# Patient Record
Sex: MALE | Race: BLACK OR AFRICAN AMERICAN | NOT HISPANIC OR LATINO | Employment: OTHER | URBAN - METROPOLITAN AREA
[De-identification: names, ages, dates, MRNs, and addresses within clinical notes are randomized per-mention and may not be internally consistent; named-entity substitution may affect disease eponyms.]

---

## 2020-12-03 ENCOUNTER — HOSPITAL ENCOUNTER (EMERGENCY)
Facility: HOSPITAL | Age: 70
Discharge: HOME | End: 2020-12-03
Attending: FAMILY MEDICINE
Payer: OTHER GOVERNMENT

## 2020-12-03 VITALS
HEIGHT: 74 IN | OXYGEN SATURATION: 97 % | WEIGHT: 168 LBS | TEMPERATURE: 98 F | HEART RATE: 69 BPM | RESPIRATION RATE: 20 BRPM | SYSTOLIC BLOOD PRESSURE: 139 MMHG | DIASTOLIC BLOOD PRESSURE: 88 MMHG | BODY MASS INDEX: 21.56 KG/M2

## 2020-12-03 DIAGNOSIS — I65.22 CAROTID STENOSIS, LEFT: ICD-10-CM

## 2020-12-03 DIAGNOSIS — R41.0 CONFUSION: ICD-10-CM

## 2020-12-03 DIAGNOSIS — I63.50 CEREBROVASCULAR ACCIDENT (CVA) DUE TO OCCLUSION OF CEREBRAL ARTERY: Primary | ICD-10-CM

## 2020-12-03 LAB
ALBUMIN SERPL BCP-MCNC: 4.6 G/DL (ref 3.5–5.2)
ALP SERPL-CCNC: 79 U/L (ref 38–126)
ALT SERPL W/O P-5'-P-CCNC: 14 U/L (ref 10–44)
AMPHET+METHAMPHET UR QL: NEGATIVE
ANION GAP SERPL CALC-SCNC: 8 MMOL/L (ref 8–16)
APTT BLDCRRT: 23.8 SEC (ref 21–32)
AST SERPL-CCNC: 23 U/L (ref 15–46)
BACTERIA #/AREA URNS AUTO: NORMAL /HPF
BARBITURATES UR QL SCN>200 NG/ML: NEGATIVE
BASOPHILS # BLD AUTO: 0.04 K/UL (ref 0–0.2)
BASOPHILS NFR BLD: 0.8 % (ref 0–1.9)
BENZODIAZ UR QL SCN>200 NG/ML: NEGATIVE
BILIRUB SERPL-MCNC: 0.9 MG/DL (ref 0.1–1)
BILIRUB UR QL STRIP: NEGATIVE
BZE UR QL SCN: NEGATIVE
CALCIUM SERPL-MCNC: 9.6 MG/DL (ref 8.7–10.5)
CANNABINOIDS UR QL SCN: NEGATIVE
CHLORIDE SERPL-SCNC: 105 MMOL/L (ref 95–110)
CHOLEST SERPL-MCNC: 176 MG/DL (ref 120–199)
CHOLEST/HDLC SERPL: 4.4 {RATIO} (ref 2–5)
CLARITY UR REFRACT.AUTO: CLEAR
CO2 SERPL-SCNC: 30 MMOL/L (ref 23–29)
COLOR UR AUTO: ABNORMAL
CREAT SERPL-MCNC: 0.94 MG/DL (ref 0.5–1.4)
CREAT UR-MCNC: 263.2 MG/DL (ref 23–375)
DIFFERENTIAL METHOD: ABNORMAL
EOSINOPHIL # BLD AUTO: 0.2 K/UL (ref 0–0.5)
EOSINOPHIL NFR BLD: 3.4 % (ref 0–8)
ERYTHROCYTE [DISTWIDTH] IN BLOOD BY AUTOMATED COUNT: 16.8 % (ref 11.5–14.5)
EST. GFR  (AFRICAN AMERICAN): >60 ML/MIN/1.73 M^2
EST. GFR  (NON AFRICAN AMERICAN): >60 ML/MIN/1.73 M^2
GLUCOSE SERPL-MCNC: 103 MG/DL (ref 70–110)
GLUCOSE UR QL STRIP: ABNORMAL
HCT VFR BLD AUTO: 53.9 % (ref 40–54)
HDLC SERPL-MCNC: 40 MG/DL (ref 40–75)
HDLC SERPL: 22.7 % (ref 20–50)
HGB BLD-MCNC: 16.5 G/DL (ref 14–18)
HGB UR QL STRIP: ABNORMAL
HYALINE CASTS UR QL AUTO: 0 /LPF
IMM GRANULOCYTES # BLD AUTO: 0.02 K/UL (ref 0–0.04)
IMM GRANULOCYTES NFR BLD AUTO: 0.4 % (ref 0–0.5)
INR PPP: 1 (ref 0.8–1.2)
KETONES UR QL STRIP: ABNORMAL
LACTATE SERPL-SCNC: 2.5 MMOL/L (ref 0.5–2.2)
LDLC SERPL CALC-MCNC: 97 MG/DL (ref 63–159)
LEUKOCYTE ESTERASE UR QL STRIP: ABNORMAL
LYMPHOCYTES # BLD AUTO: 2 K/UL (ref 1–4.8)
LYMPHOCYTES NFR BLD: 38.3 % (ref 18–48)
MAGNESIUM SERPL-MCNC: 2.2 MG/DL (ref 1.6–2.6)
MCH RBC QN AUTO: 23.1 PG (ref 27–31)
MCHC RBC AUTO-ENTMCNC: 30.6 G/DL (ref 32–36)
MCV RBC AUTO: 76 FL (ref 82–98)
METHADONE UR QL SCN>300 NG/ML: NEGATIVE
MICROSCOPIC COMMENT: NORMAL
MONOCYTES # BLD AUTO: 0.4 K/UL (ref 0.3–1)
MONOCYTES NFR BLD: 7.4 % (ref 4–15)
NEUTROPHILS # BLD AUTO: 2.6 K/UL (ref 1.8–7.7)
NEUTROPHILS NFR BLD: 49.7 % (ref 38–73)
NITRITE UR QL STRIP: NEGATIVE
NONHDLC SERPL-MCNC: 136 MG/DL
NRBC BLD-RTO: 0 /100 WBC
NT-PROBNP SERPL-MCNC: 216 PG/ML (ref 5–900)
OPIATES UR QL SCN: NEGATIVE
PCP UR QL SCN>25 NG/ML: NEGATIVE
PH UR STRIP: 5 [PH] (ref 5–8)
PLATELET # BLD AUTO: 218 K/UL (ref 150–350)
PMV BLD AUTO: ABNORMAL FL (ref 9.2–12.9)
POCT GLUCOSE: 109 MG/DL (ref 70–110)
POTASSIUM SERPL-SCNC: 4.5 MMOL/L (ref 3.5–5.1)
PROT SERPL-MCNC: 8.7 G/DL (ref 6–8.4)
PROT UR QL STRIP: ABNORMAL
PROTHROMBIN TIME: 10.5 SEC (ref 9–12.5)
RBC # BLD AUTO: 7.14 M/UL (ref 4.6–6.2)
RBC #/AREA URNS AUTO: 1 /HPF (ref 0–4)
SARS-COV-2 RDRP RESP QL NAA+PROBE: NEGATIVE
SODIUM SERPL-SCNC: 143 MMOL/L (ref 136–145)
SP GR UR STRIP: 1.02 (ref 1–1.03)
TOXICOLOGY INFORMATION: NORMAL
TRIGL SERPL-MCNC: 195 MG/DL (ref 30–150)
TSH SERPL DL<=0.005 MIU/L-ACNC: 1.33 UIU/ML (ref 0.4–4)
URN SPEC COLLECT METH UR: ABNORMAL
UROBILINOGEN UR STRIP-ACNC: ABNORMAL EU/DL
UUN UR-MCNC: 10 MG/DL (ref 2–20)
WBC # BLD AUTO: 5.25 K/UL (ref 3.9–12.7)
WBC #/AREA URNS AUTO: 2 /HPF (ref 0–5)

## 2020-12-03 PROCEDURE — 96360 HYDRATION IV INFUSION INIT: CPT | Mod: ER

## 2020-12-03 PROCEDURE — 81000 URINALYSIS NONAUTO W/SCOPE: CPT | Mod: ER,59

## 2020-12-03 PROCEDURE — 80053 COMPREHEN METABOLIC PANEL: CPT | Mod: ER

## 2020-12-03 PROCEDURE — 83880 ASSAY OF NATRIURETIC PEPTIDE: CPT | Mod: ER

## 2020-12-03 PROCEDURE — 83605 ASSAY OF LACTIC ACID: CPT | Mod: ER

## 2020-12-03 PROCEDURE — 93010 ELECTROCARDIOGRAM REPORT: CPT | Mod: ,,, | Performed by: INTERNAL MEDICINE

## 2020-12-03 PROCEDURE — U0002 COVID-19 LAB TEST NON-CDC: HCPCS | Mod: ER

## 2020-12-03 PROCEDURE — 85025 COMPLETE CBC W/AUTO DIFF WBC: CPT | Mod: ER

## 2020-12-03 PROCEDURE — 85610 PROTHROMBIN TIME: CPT | Mod: ER

## 2020-12-03 PROCEDURE — 80307 DRUG TEST PRSMV CHEM ANLYZR: CPT | Mod: ER

## 2020-12-03 PROCEDURE — 99285 EMERGENCY DEPT VISIT HI MDM: CPT | Mod: 25,ER

## 2020-12-03 PROCEDURE — 83735 ASSAY OF MAGNESIUM: CPT | Mod: ER

## 2020-12-03 PROCEDURE — 85730 THROMBOPLASTIN TIME PARTIAL: CPT | Mod: ER

## 2020-12-03 PROCEDURE — 82962 GLUCOSE BLOOD TEST: CPT | Mod: ER

## 2020-12-03 PROCEDURE — 80061 LIPID PANEL: CPT

## 2020-12-03 PROCEDURE — 84443 ASSAY THYROID STIM HORMONE: CPT | Mod: ER

## 2020-12-03 PROCEDURE — 93005 ELECTROCARDIOGRAM TRACING: CPT | Mod: ER

## 2020-12-03 PROCEDURE — 25500020 PHARM REV CODE 255: Mod: ER | Performed by: FAMILY MEDICINE

## 2020-12-03 PROCEDURE — 25000003 PHARM REV CODE 250: Mod: ER | Performed by: FAMILY MEDICINE

## 2020-12-03 PROCEDURE — 93010 EKG 12-LEAD: ICD-10-PCS | Mod: ,,, | Performed by: INTERNAL MEDICINE

## 2020-12-03 RX ORDER — ATORVASTATIN CALCIUM 40 MG/1
40 TABLET, FILM COATED ORAL DAILY
Qty: 90 TABLET | Refills: 3 | Status: SHIPPED | OUTPATIENT
Start: 2020-12-03 | End: 2021-10-04

## 2020-12-03 RX ORDER — ASPIRIN 325 MG
325 TABLET ORAL DAILY
Qty: 30 TABLET | Refills: 11 | Status: SHIPPED | OUTPATIENT
Start: 2020-12-03 | End: 2022-04-20 | Stop reason: SDUPTHER

## 2020-12-03 RX ORDER — ASPIRIN 325 MG
325 TABLET, DELAYED RELEASE (ENTERIC COATED) ORAL
Status: COMPLETED | OUTPATIENT
Start: 2020-12-03 | End: 2020-12-03

## 2020-12-03 RX ADMIN — IOHEXOL 100 ML: 350 INJECTION, SOLUTION INTRAVENOUS at 03:12

## 2020-12-03 RX ADMIN — SODIUM CHLORIDE 1000 ML: 0.9 INJECTION, SOLUTION INTRAVENOUS at 02:12

## 2020-12-03 RX ADMIN — ASPIRIN 325 MG: 325 TABLET, COATED ORAL at 02:12

## 2020-12-03 NOTE — ED PROVIDER NOTES
"Encounter Date: 12/3/2020       History     Chief Complaint   Patient presents with    Altered Mental Status     Pt brought in with c/o intermittent confusion and "dragging right leg" over about the last 3 weeks. Pt's significant other reports he is not himself.      70-year-old male who has been having mild right-sided weakness as per daughter for last 3-4 weeks.  Occasionally looks like he had slurring of speech.  Sometimes he might be more forgetful than normal.  He also has some confusion sometimes.  Today patient complains of weakness but no confusion or forgetfulness.  No headache, nausea or vomiting.  No visual changes.  Patient is not on any medication and he has not seen a physician so far.    The history is provided by the patient (Daughter).     Review of patient's allergies indicates:  No Known Allergies  History reviewed. No pertinent past medical history.  History reviewed. No pertinent surgical history.  History reviewed. No pertinent family history.  Social History     Tobacco Use    Smoking status: Current Every Day Smoker     Packs/day: 1.00     Types: Cigarettes    Smokeless tobacco: Never Used   Substance Use Topics    Alcohol use: Yes     Comment: occasionally    Drug use: Not on file     Review of Systems   Constitutional: Negative for activity change, appetite change, chills and fever.   HENT: Negative for congestion, ear discharge, rhinorrhea, sinus pressure, sinus pain, sore throat and trouble swallowing.    Eyes: Negative for photophobia, pain, discharge, redness, itching and visual disturbance.   Respiratory: Negative for cough, chest tightness, shortness of breath and wheezing.    Cardiovascular: Negative for chest pain, palpitations and leg swelling.   Gastrointestinal: Negative for abdominal distention, abdominal pain, constipation, diarrhea, nausea and vomiting.   Genitourinary: Negative for dysuria, flank pain, frequency and hematuria.   Musculoskeletal: Negative for back pain, " gait problem, neck pain and neck stiffness.   Skin: Negative for rash and wound.   Neurological: Positive for speech difficulty and weakness. Negative for dizziness, tremors, seizures, syncope, light-headedness, numbness and headaches.   Psychiatric/Behavioral: Positive for confusion. Negative for behavioral problems, hallucinations and sleep disturbance. The patient is not nervous/anxious.    All other systems reviewed and are negative.      Physical Exam     Initial Vitals [12/03/20 1254]   BP Pulse Resp Temp SpO2   139/88 92 20 98.2 °F (36.8 °C) 97 %      MAP       --         Physical Exam    Nursing note and vitals reviewed.  Constitutional: Vital signs are normal. He appears well-developed and well-nourished. He is not diaphoretic. He is active. No distress.   HENT:   Head: Normocephalic and atraumatic.   Right Ear: Tympanic membrane normal.   Left Ear: Tympanic membrane normal.   Nose: Nose normal.   Mouth/Throat: Oropharynx is clear and moist.   Eyes: Conjunctivae, EOM and lids are normal. Pupils are equal, round, and reactive to light.   Neck: Trachea normal, normal range of motion and full passive range of motion without pain. Neck supple. Normal range of motion present. No neck rigidity.   Cardiovascular: Normal rate, regular rhythm, S1 normal, S2 normal, normal heart sounds, intact distal pulses and normal pulses.   Pulmonary/Chest: Breath sounds normal. No respiratory distress. He has no wheezes. He has no rales.   Abdominal: Soft. Normal appearance and bowel sounds are normal. He exhibits no distension. There is no abdominal tenderness. There is no guarding.   Musculoskeletal: Normal range of motion. No edema.      Comments: Mild right-sided weakness.  Only noted with walking.   Lymphadenopathy:     He has no cervical adenopathy.   Neurological: He is alert and oriented to person, place, and time. He has normal reflexes. No cranial nerve deficit or sensory deficit. He exhibits normal muscle tone. He  displays a negative Romberg sign. Coordination and gait normal. GCS score is 15. GCS eye subscore is 4. GCS verbal subscore is 5. GCS motor subscore is 6.   Very subtle to minimal weakness on right lower leg.   Skin: Skin is warm and intact. Capillary refill takes less than 2 seconds. No abrasion, no bruising and no rash noted.   Psychiatric: He has a normal mood and affect. His speech is normal and behavior is normal. Judgment and thought content normal. He is not actively hallucinating. Cognition and memory are normal. He is attentive.         ED Course   Procedures  Labs Reviewed   CBC W/ AUTO DIFFERENTIAL - Abnormal; Notable for the following components:       Result Value    RBC 7.14 (*)     MCV 76 (*)     MCH 23.1 (*)     MCHC 30.6 (*)     RDW 16.8 (*)     All other components within normal limits   COMPREHENSIVE METABOLIC PANEL - Abnormal; Notable for the following components:    CO2 30 (*)     Total Protein 8.7 (*)     All other components within normal limits   LACTIC ACID, PLASMA - Abnormal; Notable for the following components:    Lactate (Lactic Acid) 2.5 (*)     All other components within normal limits   URINALYSIS, REFLEX TO URINE CULTURE - Abnormal; Notable for the following components:    Protein, UA 1+ (*)     Glucose, UA 1+ (*)     Ketones, UA 1+ (*)     Occult Blood UA Trace (*)     Urobilinogen, UA 4.0-6.0 (*)     Leukocytes, UA 1+ (*)     All other components within normal limits    Narrative:     Specimen Source->Urine   NT-PRO NATRIURETIC PEPTIDE   APTT   PROTIME-INR   MAGNESIUM   DRUG SCREEN PANEL, URINE EMERGENCY    Narrative:     Specimen Source->Urine   SARS-COV-2 RNA AMPLIFICATION, QUAL   URINALYSIS MICROSCOPIC    Narrative:     Specimen Source->Urine   LIPID PANEL   TSH   TSH   LIPID PANEL   POCT GLUCOSE     EKG Readings: (Independently Interpreted)   Initial Reading: No STEMI. Rhythm: Normal Sinus Rhythm. Heart Rate: 82. Ectopy: No Ectopy. Conduction: Normal. ST Segments: Normal ST  Segments. T Waves: Normal. T Waves Flipped: II, III and AVF. Clinical Impression: Normal Sinus Rhythm     ECG Results          EKG 12-lead (In process)  Result time 12/03/20 13:55:00    In process by Interface, Lab In [facility] (12/03/20 13:55:00)                 Narrative:    Test Reason : R41.82,    Vent. Rate : 082 BPM     Atrial Rate : 082 BPM     P-R Int : 152 ms          QRS Dur : 102 ms      QT Int : 370 ms       P-R-T Axes : 065 039 -39 degrees     QTc Int : 432 ms    Normal sinus rhythm  Inferior infarct ,age undetermined  Cannot rule out Anterior infarct ,age undetermined  Abnormal ECG  No previous ECGs available    Referred By: [email or web address]   SELF           Confirmed By:                             Imaging Results          CTA Brain (Final result)  Result time 12/03/20 15:59:48    Final result by Marvin Crawley MD (12/03/20 15:59:48)                 Impression:      High-grade stenosis left supraclinoid ICA with diminished perfusion involving the distal branches of the posterior left middle cerebral artery.    Nonspecific small caliber left A1 ABENA segment.    Chronic left parietal infarct.    All CT scans at this facility use dose modulation, iterative reconstruction and/or weight based dosing when appropriate to reduce radiation dose to as low as reasonably achievable.      Electronically signed by: Marvin Crawley MD  Date:    12/03/2020  Time:    15:59             Narrative:    EXAMINATION:  CTA HEAD    CLINICAL HISTORY:  CVA.  Stroke, follow up;    TECHNIQUE:  Standard contrast enhanced CTA of brain with 100 cc IV Omnipaque 350 contrast with 3D MIP reformations.    COMPARISON:  CT brain without contrast 12/03/2020    FINDINGS:  The right skull base ICA reveals mild calcified plaque.  The supraclinoid ICA is patent.  The right anterior and middle cerebral arteries and branches appear normal.    The left skull base ICA is patent.  There is high-grade stenosis, probably 70-80% of the left supraclinoid  ICA.  Probably best seen on source data images 111 through 119.  The left middle cerebral artery demonstrates normal M1 and M2 segments.  Distal cortical branches appear hypoperfused.  The left A1 ABENA segment is small in caliber the distal anterior cerebral artery appears patent but smaller than the right which is nonspecific.    In the posterior circulation the vertebral arteries are patent.  The basilar artery is patent.  Superior cerebellar arteries appear normal.  Posterior cerebral arteries are patent.    There is evidence of a chronic left parietal infarct.    NASCET criteria are used for carotid artery stenosis measurements.                               CT Head Without Contrast (Final result)  Result time 12/03/20 14:07:20    Final result by Nicolas Lambert MD (12/03/20 14:07:20)                 Impression:      Atrophy.  Small vessel disease.  Old left parietal territory infarct.  No acute intracranial abnormality..    All CT scans at this facility use dose modulation, iterative reconstruction and/or weight based dosing when appropriate to reduce radiation dose to as low as reasonably achievable.      Electronically signed by: Nicolas Lambert MD  Date:    12/03/2020  Time:    14:07             Narrative:    EXAMINATION:  CT HEAD WITHOUT CONTRAST    CLINICAL HISTORY:  Ataxia, stroke suspected;Altered mental status;    TECHNIQUE:  Axial CT images of the head were obtained without  contrast.    FINDINGS:  Subacute/old left parietal territory infarct..  Small-vessel disease.  Minimal intracranial atherosclerotic calcifications..  Mild diffuse cerebral atrophy..  The cranium and extracranial structures are unremarkable.  Mucosal thickening in ethmoid air cells.                               X-Ray Chest PA And Lateral (Final result)  Result time 12/03/20 13:45:00    Final result by Nicolas Lambert MD (12/03/20 13:45:00)                 Impression:      No acute cardiopulmonary disease.      Electronically signed  by: Nicolas Lambert MD  Date:    12/03/2020  Time:    13:45             Narrative:    EXAMINATION:  XR CHEST PA AND LATERAL    CLINICAL HISTORY:  Disorientation, unspecified    TECHNIQUE:  PA and lateral views of the chest were performed.    COMPARISON:  None    FINDINGS:  The lungs are clear, with normal appearance of pulmonary vasculature.  No pleural effusion or pneumothorax.    The cardiac silhouette is normal in size. The hilar and mediastinal contours are unremarkable.                                 Medical Decision Making:   Initial Assessment:   Right side mild weakness, mild forgetfulness and occasional confusion since last 3-4 weeks.  Differential Diagnosis:   CVA, TIA, dementia.  UTI, confusion.  Clinical Tests:   Lab Tests: Ordered and Reviewed  Radiological Study: Ordered and Reviewed  Medical Tests: Ordered and Reviewed  ED Management:  There is subtle right lower leg weakness.  There is no confusion today.  No source of infection.  CT shows left parietal subacute to chronic stroke.  Which is consistent with his weakness in his right lower leg.  Since this is 3 to 4-week-old.  I do not think he needs to be admitted as inpatient for further evaluation as it is much riskier for him to be admitted in acute panic situation with COVID-19.  Daughter also agreed and did not want to be admitted.  He will be referred as an outpatient and requires further MRI, Neurology follow-up.  Since patient does not have primary care daughter said she going to  somebody was going to give her earliest appointment.  Resource sheet has been given.  Follow-up ED immediately with worsening worsening symptoms.  New weakness, confusion, headaches.  Other:   I have discussed this case with another health care provider.       <> Summary of the Discussion: Discussed with [facility] Neurology resident  Since symptoms started 3 weeks ago and no new evolution he can follow-up as an outpatient.  [facility] referral has been  initiated.  Patient is also advised to get further workup with [facility] and MRI.  Advised to make an immediate appointment with neurology continue medication and follow up with [facility].  Eventually might need neurosurgery also.                             Clinical Impression:       ICD-10-CM ICD-9-CM   1. Cerebrovascular accident (CVA) due to occlusion of cerebral artery  I63.50 434.91   2. Confusion  R41.0 298.9   3. Carotid stenosis, left  I65.22 433.10                      Disposition:   Disposition: Discharged  Condition: Stable                          Mahesh Pacheco MD  12/03/20 1198

## 2020-12-04 ENCOUNTER — PES CALL (OUTPATIENT)
Dept: ADMINISTRATIVE | Facility: CLINIC | Age: 70
End: 2020-12-04

## 2020-12-05 ENCOUNTER — OFFICE VISIT (OUTPATIENT)
Dept: FAMILY MEDICINE | Facility: CLINIC | Age: 70
End: 2020-12-05
Payer: MEDICARE

## 2020-12-05 VITALS
TEMPERATURE: 97 F | OXYGEN SATURATION: 98 % | HEIGHT: 74 IN | SYSTOLIC BLOOD PRESSURE: 168 MMHG | DIASTOLIC BLOOD PRESSURE: 84 MMHG | WEIGHT: 186.19 LBS | HEART RATE: 95 BPM | BODY MASS INDEX: 23.89 KG/M2

## 2020-12-05 DIAGNOSIS — Z12.5 PROSTATE CANCER SCREENING: ICD-10-CM

## 2020-12-05 DIAGNOSIS — Z23 NEED FOR PNEUMOCOCCAL VACCINATION: ICD-10-CM

## 2020-12-05 DIAGNOSIS — R41.82 ALTERED MENTAL STATUS, UNSPECIFIED ALTERED MENTAL STATUS TYPE: ICD-10-CM

## 2020-12-05 DIAGNOSIS — E78.5 HYPERLIPIDEMIA, UNSPECIFIED HYPERLIPIDEMIA TYPE: ICD-10-CM

## 2020-12-05 DIAGNOSIS — F17.200 SMOKER: ICD-10-CM

## 2020-12-05 DIAGNOSIS — I63.9 CEREBROVASCULAR ACCIDENT (CVA), UNSPECIFIED MECHANISM: Primary | ICD-10-CM

## 2020-12-05 DIAGNOSIS — Z23 NEED FOR INFLUENZA VACCINATION: ICD-10-CM

## 2020-12-05 DIAGNOSIS — Z12.11 COLON CANCER SCREENING: ICD-10-CM

## 2020-12-05 DIAGNOSIS — E55.9 VITAMIN D DEFICIENCY: ICD-10-CM

## 2020-12-05 DIAGNOSIS — Z11.59 ENCOUNTER FOR HEPATITIS C SCREENING TEST FOR LOW RISK PATIENT: ICD-10-CM

## 2020-12-05 PROCEDURE — 90670 PCV13 VACCINE IM: CPT | Mod: S$GLB,,, | Performed by: FAMILY MEDICINE

## 2020-12-05 PROCEDURE — 90694 VACC AIIV4 NO PRSRV 0.5ML IM: CPT | Mod: S$GLB,,, | Performed by: FAMILY MEDICINE

## 2020-12-05 PROCEDURE — G0009 ADMIN PNEUMOCOCCAL VACCINE: HCPCS | Mod: S$GLB,,, | Performed by: FAMILY MEDICINE

## 2020-12-05 PROCEDURE — 99203 PR OFFICE/OUTPT VISIT, NEW, LEVL III, 30-44 MIN: ICD-10-PCS | Mod: 25,S$GLB,, | Performed by: FAMILY MEDICINE

## 2020-12-05 PROCEDURE — 99203 OFFICE O/P NEW LOW 30 MIN: CPT | Mod: 25,S$GLB,, | Performed by: FAMILY MEDICINE

## 2020-12-05 PROCEDURE — G0008 PNEUMOCOCCAL CONJUGATE VACCINE 13-VALENT LESS THAN 5YO & GREATER THAN: ICD-10-PCS | Mod: S$GLB,,, | Performed by: FAMILY MEDICINE

## 2020-12-05 PROCEDURE — 90670 PNEUMOCOCCAL CONJUGATE VACCINE 13-VALENT LESS THAN 5YO & GREATER THAN: ICD-10-PCS | Mod: S$GLB,,, | Performed by: FAMILY MEDICINE

## 2020-12-05 PROCEDURE — G0009 FLU VACCINE - QUADRIVALENT - ADJUVANTED: ICD-10-PCS | Mod: S$GLB,,, | Performed by: FAMILY MEDICINE

## 2020-12-05 PROCEDURE — 90694 FLU VACCINE - QUADRIVALENT - ADJUVANTED: ICD-10-PCS | Mod: S$GLB,,, | Performed by: FAMILY MEDICINE

## 2020-12-05 PROCEDURE — G0008 ADMIN INFLUENZA VIRUS VAC: HCPCS | Mod: S$GLB,,, | Performed by: FAMILY MEDICINE

## 2020-12-05 RX ORDER — CLOPIDOGREL BISULFATE 75 MG/1
75 TABLET ORAL DAILY
Qty: 30 TABLET | Refills: 11 | Status: SHIPPED | OUTPATIENT
Start: 2020-12-05 | End: 2021-12-05

## 2020-12-05 NOTE — PROGRESS NOTES
Chief Complaint  Chief Complaint   Patient presents with    [facility]       HPI  Willis Camargo is a 70 y.o. male with multiple medical diagnoses as listed in the medical history and problem list that presents to [facility].   [facility] was [facility] on [facility] with stroke symptoms present for 2-3 weeks. Janel says she noticed that he was slurring his speech and more confused than usual.  She could not convince him to go to the Er until several weeks later.  There has been some improvement in symptoms since then.  Still some confusion, but speech is back to normal.  Gait has changed.  No change in vision No upper extremity or lower extremity weakness noted.  He was started on lipitor, daily asa and appointment with neurology was scheduled.     Imaging and labs from the Er reviewed.       PAST MEDICAL HISTORY:  History reviewed. No pertinent past medical history.    PAST SURGICAL HISTORY:  History reviewed. No pertinent surgical history.    SOCIAL HISTORY:  Social History     Socioeconomic History    Marital status: Single     Spouse name: Not on file    Number of children: Not on file    Years of education: Not on file    Highest education level: Not on file   Occupational History    Not on file   Social Needs    Financial resource strain: Not on file    Food insecurity     Worry: Not on file     Inability: Not on file    Transportation needs     Medical: Not on file     Non-medical: Not on file   Tobacco Use    Smoking status: Current Every Day Smoker     Packs/day: 1.00     Types: Cigarettes    Smokeless tobacco: Never Used   Substance and Sexual Activity    Alcohol use: Yes     Comment: occasionally    Drug use: Not on file    Sexual activity: Not on file   Lifestyle    Physical activity     Days per week: Not on file     Minutes per session: Not on file    Stress: Not on file   Relationships    Social connections     Talks on phone: Not on file     Gets together: Not on file     Attends  "[employer] service: Not on file     Active member of club or organization: Not on file     Attends meetings of clubs or organizations: Not on file     Relationship status: Not on file   Other Topics Concern    Not on file   Social History Narrative    Not on file       FAMILY HISTORY:  History reviewed. No pertinent family history.    ALLERGIES AND MEDICATIONS: updated and reviewed.  Review of patient's allergies indicates:  No Known Allergies  Current Outpatient Medications   Medication Sig Dispense Refill    aspirin 325 MG tablet Take 1 tablet (325 mg total) by mouth once daily. 30 tablet 11    atorvastatin (LIPITOR) 40 MG tablet Take 1 tablet (40 mg total) by mouth once daily. 90 tablet 3    clopidogreL (PLAVIX) 75 mg tablet Take 1 tablet (75 mg total) by mouth once daily. 30 tablet 11     No current facility-administered medications for this visit.          ROS  Review of Systems        PHYSICAL EXAM  Vitals:    12/05/20 0849   BP: (!) 168/84   Pulse: 95   Temp: 96.9 °F (36.1 °C)   SpO2: 98%   Weight: 84.5 kg (186 lb 2.9 oz)   Height: 6' 2" (1.88 m)    Body mass index is 23.9 kg/m².  Weight: 84.5 kg (186 lb 2.9 oz)   Height: 6' 2" (188 cm)     Physical Exam      Health Maintenance       Date Due Completion Date    Hepatitis C Screening 1950 ---    TETANUS VACCINE 01/21/1970 ---    Shingles Vaccine (1 of 2) 01/21/2002 ---    Colorectal Cancer Screening 01/21/2002 ---    Pneumococcal Vaccine (65+ Low/Medium Risk) (1 of 2 - PCV13) 01/21/2017 ---    Abdominal Aortic Aneurysm Screening 01/21/2017 ---    Influenza Vaccine (1) 08/01/2020 ---    Aspirin/Antiplatelet Therapy 12/05/2021 12/5/2020    Lipid Panel 12/03/2025 12/3/2020      Reviewed the following diagnostics done in the ER:     EKG Readings: (Independently Interpreted)   Initial Reading: No STEMI. Rhythm: Normal Sinus Rhythm. Heart Rate: 82. Ectopy: No Ectopy. Conduction: Normal. ST Segments: Normal ST Segments. T Waves: Normal. T Waves Flipped: II, " III and AVF. Clinical Impression: Normal Sinus Rhythm     .CTA Brain  Narrative: EXAMINATION:  CTA HEAD    CLINICAL HISTORY:  CVA.  Stroke, follow up;    TECHNIQUE:  Standard contrast enhanced CTA of brain with 100 cc IV Omnipaque 350 contrast with 3D MIP reformations.    COMPARISON:  CT brain without contrast 12/03/2020    FINDINGS:  The right skull base ICA reveals mild calcified plaque.  The supraclinoid ICA is patent.  The right anterior and middle cerebral arteries and branches appear normal.    The left skull base ICA is patent.  There is high-grade stenosis, probably 70-80% of the left supraclinoid ICA.  Probably best seen on source data images 111 through 119.  The left middle cerebral artery demonstrates normal M1 and M2 segments.  Distal cortical branches appear hypoperfused.  The left A1 ABENA segment is small in caliber the distal anterior cerebral artery appears patent but smaller than the right which is nonspecific.    In the posterior circulation the vertebral arteries are patent.  The basilar artery is patent.  Superior cerebellar arteries appear normal.  Posterior cerebral arteries are patent.    There is evidence of a chronic left parietal infarct.    NASCET criteria are used for carotid artery stenosis measurements.  Impression: High-grade stenosis left supraclinoid ICA with diminished perfusion involving the distal branches of the posterior left middle cerebral artery.    Nonspecific small caliber left A1 ABENA segment.    Chronic left parietal infarct.    All CT scans at this facility use dose modulation, iterative reconstruction and/or weight based dosing when appropriate to reduce radiation dose to as low as reasonably achievable.    Electronically signed by: Marvin Crawley MD  Date:    12/03/2020  Time:    15:59  CT Head Without Contrast  Narrative: EXAMINATION:  CT HEAD WITHOUT CONTRAST    CLINICAL HISTORY:  Ataxia, stroke suspected;Altered mental status;    TECHNIQUE:  Axial CT images of the  head were obtained without  contrast.    FINDINGS:  Subacute/old left parietal territory infarct..  Small-vessel disease.  Minimal intracranial atherosclerotic calcifications..  Mild diffuse cerebral atrophy..  The cranium and extracranial structures are unremarkable.  Mucosal thickening in ethmoid air cells.  Impression: Atrophy.  Small vessel disease.  Old left parietal territory infarct.  No acute intracranial abnormality..    All CT scans at this facility use dose modulation, iterative reconstruction and/or weight based dosing when appropriate to reduce radiation dose to as low as reasonably achievable.    Electronically signed by: Nicolas Lambert MD  Date:    12/03/2020  Time:    14:07  X-Ray Chest PA And Lateral  Narrative: EXAMINATION:  XR CHEST PA AND LATERAL    CLINICAL HISTORY:  Disorientation, unspecified    TECHNIQUE:  PA and lateral views of the chest were performed.    COMPARISON:  None    FINDINGS:  The lungs are clear, with normal appearance of pulmonary vasculature.  No pleural effusion or pneumothorax.    The cardiac silhouette is normal in size. The hilar and mediastinal contours are unremarkable.  Impression: No acute cardiopulmonary disease.    Electronically signed by: Nicolas Lambert MD  Date:    12/03/2020  Time:    13:45        Assessment & Plan      Willis was seen today for establish care.    Diagnoses and all orders for this visit:    Cerebrovascular accident (CVA), unspecified mechanism  -     Ambulatory referral/consult to Physical/Occupational Therapy; Future  -     Ambulatory referral/consult to Physical/Occupational Therapy; Future  -     clopidogreL (PLAVIX) 75 mg tablet; Take 1 tablet (75 mg total) by mouth once daily.  -     Comprehensive Metabolic Panel; Future  -     CBC Auto Differential; Future  - Labs scheduled for 3 months.   - F/u on BP in 2 weeks.  (slightly elevated today, but normal at the ER)  - Consider carotid doppler and echo with bubble study.     Altered mental status,  unspecified altered mental status type  -     MRI Brain Without Contrast; Future    Hyperlipidemia, unspecified hyperlipidemia type  -     Lipid Panel; Future    Vitamin D deficiency  -     Vitamin D; Future    Encounter for hepatitis C screening test for low risk patient  -     Hepatitis C Antibody; Future    Colon cancer screening  -     Case Request Endoscopy: COLONOSCOPY    Need for pneumococcal vaccination  -     (In Office Administered) Pneumococcal Conjugate Vaccine (13 Valent) (IM)    Smoker  -     Ambulatory referral/consult to Smoking Cessation Program; Future  -     US Abdominal Aorta; Future    Need for influenza vaccination  -     Influenza (FLUAD) - Quadrivalent (Adjuvanted) *Preferred* (65+) (PF)    Prostate cancer screening  -     PSA, Screening; Future          Follow-up: No follow-ups on file.

## 2020-12-11 ENCOUNTER — HOSPITAL ENCOUNTER (OUTPATIENT)
Dept: RADIOLOGY | Facility: HOSPITAL | Age: 70
Discharge: HOME | End: 2020-12-11
Attending: FAMILY MEDICINE

## 2020-12-11 DIAGNOSIS — F17.200 SMOKER: ICD-10-CM

## 2020-12-11 DIAGNOSIS — R41.82 ALTERED MENTAL STATUS, UNSPECIFIED ALTERED MENTAL STATUS TYPE: ICD-10-CM

## 2020-12-11 PROCEDURE — 76775 US EXAM ABDO BACK WALL LIM: CPT | Mod: TC,PO

## 2020-12-11 PROCEDURE — 70551 MRI BRAIN STEM W/O DYE: CPT | Mod: TC,PO

## 2020-12-18 ENCOUNTER — CLINICAL SUPPORT (OUTPATIENT)
Dept: FAMILY MEDICINE | Facility: CLINIC | Age: 70
End: 2020-12-18

## 2020-12-18 VITALS
DIASTOLIC BLOOD PRESSURE: 72 MMHG | HEART RATE: 104 BPM | SYSTOLIC BLOOD PRESSURE: 108 MMHG | TEMPERATURE: 97 F | OXYGEN SATURATION: 95 %

## 2020-12-21 ENCOUNTER — OFFICE VISIT (OUTPATIENT)
Dept: NEUROLOGY | Facility: CLINIC | Age: 70
End: 2020-12-21

## 2020-12-21 VITALS
WEIGHT: 182.88 LBS | HEART RATE: 99 BPM | BODY MASS INDEX: 23.47 KG/M2 | SYSTOLIC BLOOD PRESSURE: 133 MMHG | HEIGHT: 74 IN | DIASTOLIC BLOOD PRESSURE: 67 MMHG

## 2020-12-21 DIAGNOSIS — Z72.0 TOBACCO ABUSE: ICD-10-CM

## 2020-12-21 DIAGNOSIS — I63.412 CEREBROVASCULAR ACCIDENT (CVA) DUE TO EMBOLISM OF LEFT MIDDLE CEREBRAL ARTERY: Primary | ICD-10-CM

## 2020-12-21 DIAGNOSIS — I69.319 RESIDUAL COGNITIVE DEFICIT AS LATE EFFECT OF STROKE: ICD-10-CM

## 2020-12-21 DIAGNOSIS — R48.8 ACALCULIA: ICD-10-CM

## 2020-12-21 DIAGNOSIS — I65.22 ATHEROSCLEROSIS OF LEFT CAROTID ARTERY: ICD-10-CM

## 2020-12-21 PROCEDURE — 99999 PR PBB SHADOW E&M-EST. PATIENT-LVL IV: ICD-10-PCS | Mod: PBBFAC,,, | Performed by: PSYCHIATRY & NEUROLOGY

## 2020-12-21 PROCEDURE — 99214 OFFICE O/P EST MOD 30 MIN: CPT | Mod: PBBFAC | Performed by: PSYCHIATRY & NEUROLOGY

## 2020-12-21 PROCEDURE — 99205 OFFICE O/P NEW HI 60 MIN: CPT | Mod: S$PBB,,, | Performed by: PSYCHIATRY & NEUROLOGY

## 2020-12-21 PROCEDURE — 99999 PR PBB SHADOW E&M-EST. PATIENT-LVL IV: CPT | Mod: PBBFAC,,, | Performed by: PSYCHIATRY & NEUROLOGY

## 2020-12-21 PROCEDURE — 99205 PR OFFICE/OUTPT VISIT, NEW, LEVL V, 60-74 MIN: ICD-10-PCS | Mod: S$PBB,,, | Performed by: PSYCHIATRY & NEUROLOGY

## 2020-12-21 NOTE — PROGRESS NOTES
Vascular Neurology  Clinic Note    Reason For Visit (Chief Complaint): strokes    HPI: 70 y.o. man with tobacco abuse who presented to ED for leg weakness, found to have acute R corona radiata troke and older L parietal stroke.  He is here for first evaluation with neurology.    Patient presented to [facility] ED with complaints of confusion and dragging his R leg for roughly 3 weeks.  CT showed subacute-chronic L parietal stroke.  He was discharged from ED and followed up with PCP Dr. Kerr.  MRI was done as outpateint and showed acute infarct in R centrum semiovale.  He was referred to smoking cessation and PT.    Partner states he is getting back to normal.  Still has some intermittent confusion.  Sleeping more than normal.  Not eating much.  Still smoking about a pack per day.  Is contemplative about quitting. Drinks plenty of water.  No falls at home.  Is delaying remainder of stroke workup and therapy until insurance kicks in (Jan 1?).    Brain Imaging:  CTA Brain 12/3/20:  High-grade stenosis left supraclinoid ICA with diminished perfusion involving the distal branches of the posterior left middle cerebral artery.  Nonspecific small caliber left A1 ABENA segment.  Chronic left parietal infarct.     MRI Brain 12/11/20:  Acute infarct in the deep white matter of the right centrum semiovale.  Extension of a parietal infarct with increased signal on diffusion-weighted sequences in the left parietal region.  No evidence of hemorrhage.  No evidence of mass.    Cardiac Evaluation:  12/3/20:  NSR      Relevant Labwork:  Recent Labs   Lab 12/03/20  1332   LDL Cholesterol 97.0   HDL 40   Triglycerides 195 H   Cholesterol 176       I independently viewed the above imaging studies and  I reviewed the reports of the above imaging.  I reviewed the above labwork.    Review of Systems  Msk: negative for muscle pain  Skin: negative for pruritis  Neuro: negative for headache  All others negative    Past Medical  "History  No past medical history on file.     Family History  No FH strokes.      Social History  Daily smoker.  Previously worked as a .  Living with girlfriend in [place].    Medication List with Changes/Refills   Current Medications    ASPIRIN 325 MG TABLET    Take 1 tablet (325 mg total) by mouth once daily.    ATORVASTATIN (LIPITOR) 40 MG TABLET    Take 1 tablet (40 mg total) by mouth once daily.    CLOPIDOGREL (PLAVIX) 75 MG TABLET    Take 1 tablet (75 mg total) by mouth once daily.       EXAM  Vital Signs:Blood pressure 133/67, pulse 99, height 6' 2" (1.88 m), weight 83 kg (182 lb 14 oz).  General: well appearing without discomfort   Eye: arcus senilis, ellipsoid R pupil  Neck: no carotid bruits  CV: RRR, nL S1&S2  Resp: breathing comfortably, no wheezing  Ext: wwp, no pedal edema  Skin: no lesions or rashes    Mental Status: Alert and oriented, states year is 2017, knows Trump->Obama->Contreras, unable to calculate quarters in $1.75, L/R confusion, normal attention, speech fluent and prosodic, naming and repetition intact, follows multistep embedded commands, no e/o neglect or extinction.  Registers 3/3, recalls 0/3 spontaneously (0/3 with clues).  No finger agnosia.  Cranial Nerves: PERRL, EOMI, VFF, sensation intact, face symmetric, TUP midline, SCM/trap 5/5  Motor: Normal bulk and tone, R parietal drift  Strength 5/5 throughout  Sensory: intact light touch bilaterally, intact proprioception bilaterally  Coordination: no ataxia on finger-to-nose or heel-to-shin testing; no truncal ataxia  Gait & Stance: normal  DTR: 2+ symmetric    NIHSS - 0    ___________________  ASSESSMENT & PLAN  70 y.o. man with tobacco abuse who presented to ED for leg weakness, found to have acute R CR Stroke and older L parietal stroke.  On exam he has several features of Gerstmann's syndrome (acalculia, L/R confusion), which is due to L parietal stroke.  No significant weakness from R CR stroke.  Will pursue embolic " stroke workup, though significant stenosis noted in L supraclinoid ICA (may explain L parietal stroke).  Only known stroke risk factor appears to be smoking.  Patient wishes to delay workup until insurance is active.    - Continue aspirin and Plavix for total of 30d.  Then aspirin monotherapy for secondary stroke prevention.  - Continue atorvastatin 40 mg daily for HLD.  - Referral to OT (cognitive therapy).  - TTE  - Carotid U/S  - Encouraged smoking cessation (referred to group).  - Mediterranean Diet for stroke prevention.  - RTC 3 mos.      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 Yes    VAS US Carotid Bilateral    VITAMIN B12    Ambulatory consult to Occupational Medicine    TSH    T4, Free    Atherosclerosis of left carotid artery              Liliana Barrett MD  Vascular Neurology

## 2020-12-21 NOTE — LETTER
December 21, 2020      Mahesh Pacheco MD  [address]  [place] [address]           [facility] - Neurology 7th Fl  [address]  [facility] [address]  Phone: [phone]          Patient: Willis Camargo   MR Number: [id number]   YOB: 1950   Date of Visit: 12/21/2020       Dear Dr. Mahesh Pacheco:    Thank you for referring Willis Camargo to me for evaluation. Attached you will find relevant portions of my assessment and plan of care.    If you have questions, please do not hesitate to call me. I look forward to following Willis Camargo along with you.    Sincerely,    Liliana Barrett MD    Enclosure  CC:  No Recipients    If you would like to receive this communication electronically, please contact [email or web address] or [phone] to request more information on Q[employer] Link access.    For providers and/or their staff who would like to refer a patient to [facility], please contact us through our one-stop-shop provider referral line, [facility] , at [phone].    If you feel you have received this communication in error or would no longer like to receive these types of communications, please e-mail [email or web address]

## 2020-12-21 NOTE — PATIENT INSTRUCTIONS
- Continue aspirin for stroke prevention.  - Stop Plavix (clopidogrel) after 30d.  - Continue atorvastatin 40 mg daily for cholesterol.  - Ordered occupational (cognitive therapy), echocardiogram of heart, and carotid ultrasound.    Mediterranean Diet Recommendations    · Eat primarily plant-based foods, such as fruits and vegetables, whole grains, legumes (beans) and nuts.  · Limit refined carbohydrates (white pasta, bread, rice).  · Replace butter with healthy fats such as olive oil.  · Use herbs and spices instead of salt to flavor foods.  · Limit red meat and processed meats to no more than a few times a month.  · Avoid sugary sodas, bakery goods, and sweets.  · Eat fish and poultry at least twice a week.  · Get plenty of exercise (150 minutes per week).    Adopted from Alonzo staton al, NEJM, 2018.

## 2020-12-22 PROBLEM — I69.319 RESIDUAL COGNITIVE DEFICIT AS LATE EFFECT OF STROKE: Status: ACTIVE | Noted: 2020-12-22

## 2020-12-22 PROBLEM — Z72.0 TOBACCO ABUSE: Status: ACTIVE | Noted: 2020-12-22

## 2020-12-22 PROBLEM — R48.8 ACALCULIA: Status: ACTIVE | Noted: 2020-12-22

## 2021-01-11 ENCOUNTER — TELEPHONE (OUTPATIENT)
Dept: FAMILY MEDICINE | Facility: CLINIC | Age: 71
End: 2021-01-11

## 2021-01-15 ENCOUNTER — TELEPHONE (OUTPATIENT)
Dept: FAMILY MEDICINE | Facility: CLINIC | Age: 71
End: 2021-01-15

## 2021-01-15 ENCOUNTER — TELEPHONE (OUTPATIENT)
Dept: GASTROENTEROLOGY | Facility: CLINIC | Age: 71
End: 2021-01-15

## 2021-01-15 DIAGNOSIS — Z01.812 PRE-PROCEDURE LAB EXAM: ICD-10-CM

## 2021-01-19 RX ORDER — SODIUM, POTASSIUM,MAG SULFATES 17.5-3.13G
1 SOLUTION, RECONSTITUTED, ORAL ORAL DAILY
Qty: 1 KIT | Refills: 0 | Status: SHIPPED | OUTPATIENT
Start: 2021-01-19 | End: 2021-01-21

## 2021-01-29 ENCOUNTER — HOSPITAL ENCOUNTER (OUTPATIENT)
Dept: CARDIOLOGY | Facility: HOSPITAL | Age: 71
Discharge: HOME | End: 2021-01-29
Attending: PSYCHIATRY & NEUROLOGY
Payer: MEDICARE

## 2021-01-29 DIAGNOSIS — I63.412 CEREBROVASCULAR ACCIDENT (CVA) DUE TO EMBOLISM OF LEFT MIDDLE CEREBRAL ARTERY: ICD-10-CM

## 2021-01-29 LAB
AORTIC ROOT ANNULUS: 3.09 CM
AORTIC VALVE CUSP SEPERATION: 1.95 CM
AV INDEX (PROSTH): 0.86
AV MEAN GRADIENT: 3 MMHG
AV PEAK GRADIENT: 4 MMHG
AV VALVE AREA: 3.71 CM2
AV VELOCITY RATIO: 0.95
CV ECHO LV RWT: 0.7 CM
DOP CALC AO PEAK VEL: 1.01 M/S
DOP CALC AO VTI: 20.37 CM
DOP CALC LVOT AREA: 4.3 CM2
DOP CALC LVOT DIAMETER: 2.34 CM
DOP CALC LVOT PEAK VEL: 0.96 M/S
DOP CALC LVOT STROKE VOLUME: 75.65 CM3
DOP CALCLVOT PEAK VEL VTI: 17.6 CM
E WAVE DECELERATION TIME: 227.81 MSEC
E/A RATIO: 0.63
E/E' RATIO: 10.36 M/S
ECHO LV POSTERIOR WALL: 1.37 CM (ref 0.6–1.1)
FRACTIONAL SHORTENING: 32 % (ref 28–44)
INTERVENTRICULAR SEPTUM: 1.48 CM (ref 0.6–1.1)
IVC PROX: 1.4 CM
LA MAJOR: 4.39 CM
LA MINOR: 3.92 CM
LA WIDTH: 3.7 CM
LEFT ATRIUM SIZE: 3.32 CM
LEFT ATRIUM VOLUME MOD: 23.1 CM3
LEFT ATRIUM VOLUME: 43.25 CM3
LEFT INTERNAL DIMENSION IN SYSTOLE: 2.65 CM (ref 2.1–4)
LEFT VENTRICLE DIASTOLIC VOLUME: 66.17 ML
LEFT VENTRICLE SYSTOLIC VOLUME: 25.77 ML
LEFT VENTRICULAR INTERNAL DIMENSION IN DIASTOLE: 3.91 CM (ref 3.5–6)
LEFT VENTRICULAR MASS: 207.88 G
LV LATERAL E/E' RATIO: 9.5 M/S
LV SEPTAL E/E' RATIO: 11.4 M/S
MV A" WAVE DURATION": 12.11 MSEC
MV PEAK A VEL: 0.9 M/S
MV PEAK E VEL: 0.57 M/S
PISA TR MAX VEL: 2.3 M/S
PULM VEIN S/D RATIO: 1.35
PV PEAK D VEL: 0.49 M/S
PV PEAK S VEL: 0.66 M/S
PV PEAK VELOCITY: 0.71 CM/S
RA MAJOR: 4 CM
RA PRESSURE: 3 MMHG
RA WIDTH: 3.32 CM
RIGHT VENTRICULAR END-DIASTOLIC DIMENSION: 2.63 CM
RV TISSUE DOPPLER FREE WALL SYSTOLIC VELOCITY 1 (APICAL 4 CHAMBER VIEW): 16.2 CM/S
SINUS: 2.87 CM
TDI LATERAL: 0.06 M/S
TDI SEPTAL: 0.05 M/S
TDI: 0.06 M/S
TR MAX PG: 21 MMHG
TRICUSPID ANNULAR PLANE SYSTOLIC EXCURSION: 1.84 CM
TV REST PULMONARY ARTERY PRESSURE: 24 MMHG

## 2021-01-29 PROCEDURE — 93306 TTE W/DOPPLER COMPLETE: CPT | Mod: 26,,, | Performed by: INTERNAL MEDICINE

## 2021-01-29 PROCEDURE — 93306 TTE W/DOPPLER COMPLETE: CPT | Mod: PO

## 2021-01-29 PROCEDURE — 93306 ECHO (CUPID ONLY): ICD-10-PCS | Mod: 26,,, | Performed by: INTERNAL MEDICINE

## 2021-02-01 ENCOUNTER — LAB VISIT (OUTPATIENT)
Dept: FAMILY MEDICINE | Facility: CLINIC | Age: 71
End: 2021-02-01
Payer: MEDICARE

## 2021-02-01 DIAGNOSIS — Z01.812 PRE-PROCEDURE LAB EXAM: ICD-10-CM

## 2021-02-01 PROCEDURE — U0003 INFECTIOUS AGENT DETECTION BY NUCLEIC ACID (DNA OR RNA); SEVERE ACUTE RESPIRATORY SYNDROME CORONAVIRUS 2 (SARS-COV-2) (CORONAVIRUS DISEASE [COVID-19]), AMPLIFIED PROBE TECHNIQUE, MAKING USE OF HIGH THROUGHPUT TECHNOLOGIES AS DESCRIBED BY CMS-2020-01-R: HCPCS

## 2021-02-02 ENCOUNTER — TELEPHONE (OUTPATIENT)
Dept: ENDOSCOPY | Facility: HOSPITAL | Age: 71
End: 2021-02-02

## 2021-02-02 LAB — SARS-COV-2 RNA RESP QL NAA+PROBE: NOT DETECTED

## 2021-02-04 ENCOUNTER — HOSPITAL ENCOUNTER (OUTPATIENT)
Facility: HOSPITAL | Age: 71
Discharge: HOME | End: 2021-02-04
Attending: INTERNAL MEDICINE | Admitting: INTERNAL MEDICINE
Payer: MEDICARE

## 2021-02-04 ENCOUNTER — ANESTHESIA EVENT (OUTPATIENT)
Dept: ENDOSCOPY | Facility: HOSPITAL | Age: 71
End: 2021-02-04

## 2021-02-04 ENCOUNTER — ANESTHESIA (OUTPATIENT)
Dept: ENDOSCOPY | Facility: HOSPITAL | Age: 71
End: 2021-02-04

## 2021-02-04 VITALS
HEART RATE: 86 BPM | TEMPERATURE: 98 F | SYSTOLIC BLOOD PRESSURE: 138 MMHG | HEIGHT: 74 IN | OXYGEN SATURATION: 98 % | DIASTOLIC BLOOD PRESSURE: 66 MMHG | BODY MASS INDEX: 23.87 KG/M2 | WEIGHT: 186 LBS | RESPIRATION RATE: 20 BRPM

## 2021-02-04 DIAGNOSIS — Z12.11 SCREENING FOR MALIGNANT NEOPLASM OF COLON: ICD-10-CM

## 2021-02-04 PROCEDURE — 25000003 PHARM REV CODE 250: Performed by: INTERNAL MEDICINE

## 2021-02-04 PROCEDURE — 88305 TISSUE EXAM BY PATHOLOGIST: CPT | Performed by: PATHOLOGY

## 2021-02-04 PROCEDURE — 25000003 PHARM REV CODE 250: Performed by: NURSE ANESTHETIST, CERTIFIED REGISTERED

## 2021-02-04 PROCEDURE — 88305 TISSUE EXAM BY PATHOLOGIST: CPT | Mod: 26,,, | Performed by: PATHOLOGY

## 2021-02-04 PROCEDURE — 37000009 HC ANESTHESIA EA ADD 15 MINS: Performed by: INTERNAL MEDICINE

## 2021-02-04 PROCEDURE — 27201012 HC FORCEPS, HOT/COLD, DISP: Performed by: INTERNAL MEDICINE

## 2021-02-04 PROCEDURE — 37000008 HC ANESTHESIA 1ST 15 MINUTES: Performed by: INTERNAL MEDICINE

## 2021-02-04 PROCEDURE — 45385 COLONOSCOPY W/LESION REMOVAL: CPT | Performed by: INTERNAL MEDICINE

## 2021-02-04 PROCEDURE — 27201089 HC SNARE, DISP (ANY): Performed by: INTERNAL MEDICINE

## 2021-02-04 PROCEDURE — 45380 PR COLONOSCOPY,BIOPSY: ICD-10-PCS | Mod: ,,, | Performed by: INTERNAL MEDICINE

## 2021-02-04 PROCEDURE — 45380 COLONOSCOPY AND BIOPSY: CPT | Performed by: INTERNAL MEDICINE

## 2021-02-04 PROCEDURE — 63600175 PHARM REV CODE 636 W HCPCS: Performed by: NURSE ANESTHETIST, CERTIFIED REGISTERED

## 2021-02-04 PROCEDURE — 88305 TISSUE EXAM BY PATHOLOGIST: ICD-10-PCS | Mod: 26,,, | Performed by: PATHOLOGY

## 2021-02-04 PROCEDURE — 45380 COLONOSCOPY AND BIOPSY: CPT | Mod: ,,, | Performed by: INTERNAL MEDICINE

## 2021-02-04 RX ORDER — PROPOFOL 10 MG/ML
VIAL (ML) INTRAVENOUS
Status: DISCONTINUED | OUTPATIENT
Start: 2021-02-04 | End: 2021-02-04

## 2021-02-04 RX ORDER — LIDOCAINE HYDROCHLORIDE 20 MG/ML
INJECTION INTRAVENOUS
Status: DISCONTINUED | OUTPATIENT
Start: 2021-02-04 | End: 2021-02-04

## 2021-02-04 RX ORDER — PROPOFOL 10 MG/ML
VIAL (ML) INTRAVENOUS CONTINUOUS PRN
Status: DISCONTINUED | OUTPATIENT
Start: 2021-02-04 | End: 2021-02-04

## 2021-02-04 RX ORDER — SODIUM CHLORIDE 9 MG/ML
INJECTION, SOLUTION INTRAVENOUS CONTINUOUS
Status: DISCONTINUED | OUTPATIENT
Start: 2021-02-04 | End: 2021-02-04 | Stop reason: HOSPADM

## 2021-02-04 RX ORDER — SODIUM CHLORIDE 0.9 % (FLUSH) 0.9 %
10 SYRINGE (ML) INJECTION
Status: DISCONTINUED | OUTPATIENT
Start: 2021-02-04 | End: 2021-02-04 | Stop reason: HOSPADM

## 2021-02-04 RX ADMIN — PROPOFOL 150 MCG/KG/MIN: 10 INJECTION, EMULSION INTRAVENOUS at 10:02

## 2021-02-04 RX ADMIN — LIDOCAINE HYDROCHLORIDE 100 MG: 20 INJECTION, SOLUTION INTRAVENOUS at 10:02

## 2021-02-04 RX ADMIN — PROPOFOL 80 MG: 10 INJECTION, EMULSION INTRAVENOUS at 10:02

## 2021-02-04 RX ADMIN — SODIUM CHLORIDE: 0.9 INJECTION, SOLUTION INTRAVENOUS at 10:02

## 2021-02-09 LAB
FINAL PATHOLOGIC DIAGNOSIS: NORMAL
GROSS: NORMAL
Lab: NORMAL

## 2021-03-04 DIAGNOSIS — I69.319 RESIDUAL COGNITIVE DEFICIT AS LATE EFFECT OF STROKE: Primary | ICD-10-CM

## 2021-03-05 ENCOUNTER — LAB VISIT (OUTPATIENT)
Dept: LAB | Facility: HOSPITAL | Age: 71
End: 2021-03-05
Attending: FAMILY MEDICINE

## 2021-03-05 DIAGNOSIS — E55.9 VITAMIN D DEFICIENCY: ICD-10-CM

## 2021-03-05 DIAGNOSIS — Z12.5 PROSTATE CANCER SCREENING: ICD-10-CM

## 2021-03-05 DIAGNOSIS — I63.9 CEREBROVASCULAR ACCIDENT (CVA), UNSPECIFIED MECHANISM: ICD-10-CM

## 2021-03-05 DIAGNOSIS — Z11.59 ENCOUNTER FOR HEPATITIS C SCREENING TEST FOR LOW RISK PATIENT: ICD-10-CM

## 2021-03-05 DIAGNOSIS — E78.5 HYPERLIPIDEMIA, UNSPECIFIED HYPERLIPIDEMIA TYPE: ICD-10-CM

## 2021-03-05 LAB
25(OH)D3+25(OH)D2 SERPL-MCNC: 8 NG/ML (ref 30–96)
ALBUMIN SERPL BCP-MCNC: 4.3 G/DL (ref 3.5–5.2)
ALP SERPL-CCNC: 85 U/L (ref 38–126)
ALT SERPL W/O P-5'-P-CCNC: 18 U/L (ref 10–44)
ANION GAP SERPL CALC-SCNC: 6 MMOL/L (ref 8–16)
AST SERPL-CCNC: 23 U/L (ref 15–46)
BASOPHILS # BLD AUTO: 0.02 K/UL (ref 0–0.2)
BASOPHILS NFR BLD: 0.4 % (ref 0–1.9)
BILIRUB SERPL-MCNC: 0.7 MG/DL (ref 0.1–1)
CALCIUM SERPL-MCNC: 9.6 MG/DL (ref 8.7–10.5)
CHLORIDE SERPL-SCNC: 107 MMOL/L (ref 95–110)
CHOLEST SERPL-MCNC: 117 MG/DL (ref 120–199)
CHOLEST/HDLC SERPL: 3.1 {RATIO} (ref 2–5)
CO2 SERPL-SCNC: 32 MMOL/L (ref 23–29)
COMPLEXED PSA SERPL-MCNC: 4.1 NG/ML (ref 0–4)
CREAT SERPL-MCNC: 0.91 MG/DL (ref 0.5–1.4)
DIFFERENTIAL METHOD: ABNORMAL
EOSINOPHIL # BLD AUTO: 0.4 K/UL (ref 0–0.5)
EOSINOPHIL NFR BLD: 8.9 % (ref 0–8)
ERYTHROCYTE [DISTWIDTH] IN BLOOD BY AUTOMATED COUNT: 16.1 % (ref 11.5–14.5)
EST. GFR  (AFRICAN AMERICAN): >60 ML/MIN/1.73 M^2
EST. GFR  (NON AFRICAN AMERICAN): >60 ML/MIN/1.73 M^2
GLUCOSE SERPL-MCNC: 104 MG/DL (ref 70–110)
HCT VFR BLD AUTO: 48.8 % (ref 40–54)
HDLC SERPL-MCNC: 38 MG/DL (ref 40–75)
HDLC SERPL: 32.5 % (ref 20–50)
HGB BLD-MCNC: 14.5 G/DL (ref 14–18)
IMM GRANULOCYTES # BLD AUTO: 0.01 K/UL (ref 0–0.04)
IMM GRANULOCYTES NFR BLD AUTO: 0.2 % (ref 0–0.5)
LDLC SERPL CALC-MCNC: 67 MG/DL (ref 63–159)
LYMPHOCYTES # BLD AUTO: 1.7 K/UL (ref 1–4.8)
LYMPHOCYTES NFR BLD: 38 % (ref 18–48)
MCH RBC QN AUTO: 22.7 PG (ref 27–31)
MCHC RBC AUTO-ENTMCNC: 29.7 G/DL (ref 32–36)
MCV RBC AUTO: 76 FL (ref 82–98)
MONOCYTES # BLD AUTO: 0.5 K/UL (ref 0.3–1)
MONOCYTES NFR BLD: 10 % (ref 4–15)
NEUTROPHILS # BLD AUTO: 1.9 K/UL (ref 1.8–7.7)
NEUTROPHILS NFR BLD: 42.5 % (ref 38–73)
NONHDLC SERPL-MCNC: 79 MG/DL
NRBC BLD-RTO: 0 /100 WBC
PLATELET # BLD AUTO: 209 K/UL (ref 150–350)
PMV BLD AUTO: ABNORMAL FL (ref 9.2–12.9)
POTASSIUM SERPL-SCNC: 4.5 MMOL/L (ref 3.5–5.1)
PROT SERPL-MCNC: 7.7 G/DL (ref 6–8.4)
RBC # BLD AUTO: 6.39 M/UL (ref 4.6–6.2)
SODIUM SERPL-SCNC: 145 MMOL/L (ref 136–145)
TRIGL SERPL-MCNC: 60 MG/DL (ref 30–150)
UUN UR-MCNC: 9 MG/DL (ref 2–20)
WBC # BLD AUTO: 4.5 K/UL (ref 3.9–12.7)

## 2021-03-05 PROCEDURE — 85025 COMPLETE CBC W/AUTO DIFF WBC: CPT | Mod: PO | Performed by: FAMILY MEDICINE

## 2021-03-05 PROCEDURE — 84153 ASSAY OF PSA TOTAL: CPT | Performed by: FAMILY MEDICINE

## 2021-03-05 PROCEDURE — 86803 HEPATITIS C AB TEST: CPT | Mod: PO | Performed by: FAMILY MEDICINE

## 2021-03-05 PROCEDURE — 82306 VITAMIN D 25 HYDROXY: CPT | Mod: PO | Performed by: FAMILY MEDICINE

## 2021-03-05 PROCEDURE — 80061 LIPID PANEL: CPT | Performed by: FAMILY MEDICINE

## 2021-03-05 PROCEDURE — 36415 COLL VENOUS BLD VENIPUNCTURE: CPT | Mod: PO | Performed by: FAMILY MEDICINE

## 2021-03-05 PROCEDURE — 80053 COMPREHEN METABOLIC PANEL: CPT | Mod: PO | Performed by: FAMILY MEDICINE

## 2021-03-08 LAB — HCV AB SERPL QL IA: NEGATIVE

## 2021-03-09 RX ORDER — ERGOCALCIFEROL 1.25 MG/1
50000 CAPSULE ORAL
Qty: 12 CAPSULE | Refills: 1 | Status: SHIPPED | OUTPATIENT
Start: 2021-03-09 | End: 2021-08-12 | Stop reason: SDUPTHER

## 2021-03-30 ENCOUNTER — TELEPHONE (OUTPATIENT)
Dept: CARDIOLOGY | Facility: HOSPITAL | Age: 71
End: 2021-03-30

## 2021-03-30 ENCOUNTER — CLINICAL SUPPORT (OUTPATIENT)
Dept: CARDIOLOGY | Facility: HOSPITAL | Age: 71
End: 2021-03-30
Attending: PSYCHIATRY & NEUROLOGY
Payer: MEDICARE

## 2021-03-30 DIAGNOSIS — I69.319 RESIDUAL COGNITIVE DEFICIT AS LATE EFFECT OF STROKE: ICD-10-CM

## 2021-03-30 PROCEDURE — 93272 ECG/REVIEW INTERPRET ONLY: CPT | Mod: ,,, | Performed by: INTERNAL MEDICINE

## 2021-03-30 PROCEDURE — 93271 ECG/MONITORING AND ANALYSIS: CPT

## 2021-03-30 PROCEDURE — 93272 CARDIAC EVENT MONITOR (CUPID ONLY): ICD-10-PCS | Mod: ,,, | Performed by: INTERNAL MEDICINE

## 2021-05-26 ENCOUNTER — TELEPHONE (OUTPATIENT)
Dept: NEUROLOGY | Facility: HOSPITAL | Age: 71
End: 2021-05-26

## 2021-08-12 ENCOUNTER — OFFICE VISIT (OUTPATIENT)
Dept: FAMILY MEDICINE | Facility: CLINIC | Age: 71
End: 2021-08-12
Payer: MEDICARE

## 2021-08-12 VITALS
SYSTOLIC BLOOD PRESSURE: 134 MMHG | WEIGHT: 191.13 LBS | BODY MASS INDEX: 24.53 KG/M2 | OXYGEN SATURATION: 96 % | TEMPERATURE: 99 F | DIASTOLIC BLOOD PRESSURE: 78 MMHG | HEART RATE: 98 BPM | HEIGHT: 74 IN

## 2021-08-12 DIAGNOSIS — E78.5 HYPERLIPIDEMIA, UNSPECIFIED HYPERLIPIDEMIA TYPE: ICD-10-CM

## 2021-08-12 DIAGNOSIS — E55.9 VITAMIN D DEFICIENCY: ICD-10-CM

## 2021-08-12 DIAGNOSIS — I63.9 CEREBROVASCULAR ACCIDENT (CVA), UNSPECIFIED MECHANISM: ICD-10-CM

## 2021-08-12 DIAGNOSIS — Z23 NEED FOR PNEUMOCOCCAL VACCINATION: Primary | ICD-10-CM

## 2021-08-12 DIAGNOSIS — F17.200 SMOKER: ICD-10-CM

## 2021-08-12 PROCEDURE — 90732 PNEUMOCOCCAL POLYSACCHARIDE VACCINE 23-VALENT =>2YO SQ IM: ICD-10-PCS | Mod: S$GLB,,, | Performed by: FAMILY MEDICINE

## 2021-08-12 PROCEDURE — 99214 PR OFFICE/OUTPT VISIT, EST, LEVL IV, 30-39 MIN: ICD-10-PCS | Mod: S$GLB,,, | Performed by: FAMILY MEDICINE

## 2021-08-12 PROCEDURE — 99214 OFFICE O/P EST MOD 30 MIN: CPT | Mod: S$GLB,,, | Performed by: FAMILY MEDICINE

## 2021-08-12 PROCEDURE — G0009 PNEUMOCOCCAL POLYSACCHARIDE VACCINE 23-VALENT =>2YO SQ IM: ICD-10-PCS | Mod: S$GLB,,, | Performed by: FAMILY MEDICINE

## 2021-08-12 PROCEDURE — G0009 ADMIN PNEUMOCOCCAL VACCINE: HCPCS | Mod: S$GLB,,, | Performed by: FAMILY MEDICINE

## 2021-08-12 PROCEDURE — 90732 PPSV23 VACC 2 YRS+ SUBQ/IM: CPT | Mod: S$GLB,,, | Performed by: FAMILY MEDICINE

## 2021-08-12 RX ORDER — ERGOCALCIFEROL 1.25 MG/1
50000 CAPSULE ORAL
Qty: 12 CAPSULE | Refills: 1 | Status: SHIPPED | OUTPATIENT
Start: 2021-08-12 | End: 2022-04-20 | Stop reason: SDUPTHER

## 2021-08-18 ENCOUNTER — CLINICAL SUPPORT (OUTPATIENT)
Dept: REHABILITATION | Facility: HOSPITAL | Age: 71
End: 2021-08-18
Payer: MEDICARE

## 2021-08-18 DIAGNOSIS — R26.9 ABNORMAL GAIT: ICD-10-CM

## 2021-08-18 DIAGNOSIS — I63.9 CEREBROVASCULAR ACCIDENT (CVA), UNSPECIFIED MECHANISM: ICD-10-CM

## 2021-08-18 PROCEDURE — 97161 PT EVAL LOW COMPLEX 20 MIN: CPT | Mod: PN

## 2021-08-19 ENCOUNTER — CLINICAL SUPPORT (OUTPATIENT)
Dept: REHABILITATION | Facility: HOSPITAL | Age: 71
End: 2021-08-19
Payer: MEDICARE

## 2021-08-19 DIAGNOSIS — I69.320 APHASIA FOLLOWING CEREBRAL INFARCTION: ICD-10-CM

## 2021-08-19 DIAGNOSIS — R29.898 DECREASED GRIP STRENGTH OF RIGHT HAND: ICD-10-CM

## 2021-08-19 DIAGNOSIS — I63.9 CEREBROVASCULAR ACCIDENT (CVA), UNSPECIFIED MECHANISM: Primary | ICD-10-CM

## 2021-08-19 DIAGNOSIS — I63.9 CEREBROVASCULAR ACCIDENT (CVA), UNSPECIFIED MECHANISM: ICD-10-CM

## 2021-08-19 DIAGNOSIS — Z78.9 IMPAIRED INSTRUMENTAL ACTIVITIES OF DAILY LIVING (IADL): ICD-10-CM

## 2021-08-19 DIAGNOSIS — R27.8 IMPAIRED COORDINATION OF UPPER EXTREMITY: ICD-10-CM

## 2021-08-19 PROCEDURE — 97166 OT EVAL MOD COMPLEX 45 MIN: CPT | Mod: PN

## 2021-08-20 PROBLEM — R26.9 ABNORMAL GAIT: Status: ACTIVE | Noted: 2021-08-20

## 2021-08-24 ENCOUNTER — CLINICAL SUPPORT (OUTPATIENT)
Dept: REHABILITATION | Facility: HOSPITAL | Age: 71
End: 2021-08-24
Payer: MEDICARE

## 2021-08-24 DIAGNOSIS — R26.9 ABNORMAL GAIT: ICD-10-CM

## 2021-08-24 PROCEDURE — 97112 NEUROMUSCULAR REEDUCATION: CPT | Mod: PN,CQ

## 2021-08-24 PROCEDURE — 97110 THERAPEUTIC EXERCISES: CPT | Mod: PN,CQ

## 2021-09-08 ENCOUNTER — CLINICAL SUPPORT (OUTPATIENT)
Dept: SMOKING CESSATION | Facility: CLINIC | Age: 71
End: 2021-09-08
Payer: COMMERCIAL

## 2021-09-08 DIAGNOSIS — F17.200 NICOTINE DEPENDENCE: Primary | ICD-10-CM

## 2021-09-08 PROCEDURE — 99404 PREV MED CNSL INDIV APPRX 60: CPT | Mod: S$GLB,,,

## 2021-09-08 PROCEDURE — 99404 PR PREVENT COUNSEL,INDIV,60 MIN: ICD-10-PCS | Mod: S$GLB,,,

## 2021-09-08 RX ORDER — IBUPROFEN 200 MG
1 TABLET ORAL DAILY
Qty: 28 PATCH | Refills: 0 | Status: SHIPPED | OUTPATIENT
Start: 2021-09-08 | End: 2022-04-20

## 2021-09-15 ENCOUNTER — CLINICAL SUPPORT (OUTPATIENT)
Dept: SMOKING CESSATION | Facility: CLINIC | Age: 71
End: 2021-09-15
Payer: COMMERCIAL

## 2021-09-15 DIAGNOSIS — F17.200 NICOTINE DEPENDENCE: Primary | ICD-10-CM

## 2021-09-15 PROCEDURE — 99401 PREV MED CNSL INDIV APPRX 15: CPT | Mod: S$GLB,,,

## 2021-09-15 PROCEDURE — 99401 PR PREVENT COUNSEL,INDIV,15 MIN: ICD-10-PCS | Mod: S$GLB,,,

## 2021-09-23 ENCOUNTER — CLINICAL SUPPORT (OUTPATIENT)
Dept: SMOKING CESSATION | Facility: CLINIC | Age: 71
End: 2021-09-23
Payer: COMMERCIAL

## 2021-09-23 ENCOUNTER — TELEPHONE (OUTPATIENT)
Dept: SMOKING CESSATION | Facility: CLINIC | Age: 71
End: 2021-09-23

## 2021-09-23 DIAGNOSIS — F17.200 NICOTINE DEPENDENCE: Primary | ICD-10-CM

## 2021-09-23 PROCEDURE — 99404 PREV MED CNSL INDIV APPRX 60: CPT | Mod: S$GLB,,,

## 2021-09-23 PROCEDURE — 99404 PR PREVENT COUNSEL,INDIV,60 MIN: ICD-10-PCS | Mod: S$GLB,,,

## 2021-09-28 ENCOUNTER — DOCUMENTATION ONLY (OUTPATIENT)
Dept: REHABILITATION | Facility: HOSPITAL | Age: 71
End: 2021-09-28

## 2021-09-30 ENCOUNTER — TELEPHONE (OUTPATIENT)
Dept: SMOKING CESSATION | Facility: CLINIC | Age: 71
End: 2021-09-30

## 2021-10-04 RX ORDER — ATORVASTATIN CALCIUM 40 MG/1
TABLET, FILM COATED ORAL
Qty: 90 TABLET | Refills: 3 | Status: SHIPPED | OUTPATIENT
Start: 2021-10-04 | End: 2022-04-20 | Stop reason: SDUPTHER

## 2021-10-06 ENCOUNTER — TELEPHONE (OUTPATIENT)
Dept: SMOKING CESSATION | Facility: CLINIC | Age: 71
End: 2021-10-06

## 2021-10-07 ENCOUNTER — CLINICAL SUPPORT (OUTPATIENT)
Dept: SMOKING CESSATION | Facility: CLINIC | Age: 71
End: 2021-10-07
Payer: COMMERCIAL

## 2021-10-07 DIAGNOSIS — F17.200 NICOTINE DEPENDENCE: Primary | ICD-10-CM

## 2021-10-07 PROCEDURE — 99403 PREV MED CNSL INDIV APPRX 45: CPT | Mod: S$GLB,,,

## 2021-10-07 PROCEDURE — 99403 PR PREVENT COUNSEL,INDIV,45 MIN: ICD-10-PCS | Mod: S$GLB,,,

## 2021-10-14 ENCOUNTER — CLINICAL SUPPORT (OUTPATIENT)
Dept: SMOKING CESSATION | Facility: CLINIC | Age: 71
End: 2021-10-14
Payer: COMMERCIAL

## 2021-10-14 DIAGNOSIS — F17.200 NICOTINE DEPENDENCE: Primary | ICD-10-CM

## 2021-10-14 PROCEDURE — 99402 PR PREVENT COUNSEL,INDIV,30 MIN: ICD-10-PCS | Mod: S$GLB,,,

## 2021-10-14 PROCEDURE — 99999 PR PBB SHADOW E&M-EST. PATIENT-LVL I: ICD-10-PCS | Mod: PBBFAC,,,

## 2021-10-14 PROCEDURE — 99402 PREV MED CNSL INDIV APPRX 30: CPT | Mod: S$GLB,,,

## 2021-10-14 PROCEDURE — 99999 PR PBB SHADOW E&M-EST. PATIENT-LVL I: CPT | Mod: PBBFAC,,,

## 2021-10-18 ENCOUNTER — TELEPHONE (OUTPATIENT)
Dept: SMOKING CESSATION | Facility: CLINIC | Age: 71
End: 2021-10-18

## 2021-12-07 ENCOUNTER — TELEPHONE (OUTPATIENT)
Dept: SMOKING CESSATION | Facility: CLINIC | Age: 71
End: 2021-12-07
Payer: COMMERCIAL

## 2022-03-16 ENCOUNTER — TELEPHONE (OUTPATIENT)
Dept: SMOKING CESSATION | Facility: CLINIC | Age: 72
End: 2022-03-16
Payer: COMMERCIAL

## 2022-04-20 ENCOUNTER — OFFICE VISIT (OUTPATIENT)
Dept: FAMILY MEDICINE | Facility: CLINIC | Age: 72
End: 2022-04-20
Payer: MEDICARE

## 2022-04-20 VITALS
BODY MASS INDEX: 25.71 KG/M2 | HEART RATE: 107 BPM | HEIGHT: 74 IN | OXYGEN SATURATION: 95 % | DIASTOLIC BLOOD PRESSURE: 82 MMHG | WEIGHT: 200.31 LBS | SYSTOLIC BLOOD PRESSURE: 138 MMHG | TEMPERATURE: 97 F

## 2022-04-20 DIAGNOSIS — Z72.0 TOBACCO ABUSE: ICD-10-CM

## 2022-04-20 DIAGNOSIS — I63.9 CEREBROVASCULAR ACCIDENT (CVA), UNSPECIFIED MECHANISM: ICD-10-CM

## 2022-04-20 DIAGNOSIS — R73.9 HYPERGLYCEMIA: Primary | ICD-10-CM

## 2022-04-20 DIAGNOSIS — Z13.6 SCREENING FOR CARDIOVASCULAR CONDITION: ICD-10-CM

## 2022-04-20 DIAGNOSIS — Z12.5 PROSTATE CANCER SCREENING: ICD-10-CM

## 2022-04-20 DIAGNOSIS — E55.9 VITAMIN D DEFICIENCY: ICD-10-CM

## 2022-04-20 DIAGNOSIS — I69.319 RESIDUAL COGNITIVE DEFICIT AS LATE EFFECT OF STROKE: ICD-10-CM

## 2022-04-20 PROCEDURE — 99214 PR OFFICE/OUTPT VISIT, EST, LEVL IV, 30-39 MIN: ICD-10-PCS | Mod: S$GLB,,, | Performed by: STUDENT IN AN ORGANIZED HEALTH CARE EDUCATION/TRAINING PROGRAM

## 2022-04-20 PROCEDURE — 99214 OFFICE O/P EST MOD 30 MIN: CPT | Mod: S$GLB,,, | Performed by: STUDENT IN AN ORGANIZED HEALTH CARE EDUCATION/TRAINING PROGRAM

## 2022-04-20 RX ORDER — ASPIRIN 325 MG
325 TABLET ORAL DAILY
Qty: 30 TABLET | Refills: 11
Start: 2022-04-20

## 2022-04-20 RX ORDER — ERGOCALCIFEROL 1.25 MG/1
50000 CAPSULE ORAL
Qty: 12 CAPSULE | Refills: 1 | Status: SHIPPED | OUTPATIENT
Start: 2022-04-20 | End: 2022-10-16

## 2022-04-20 RX ORDER — ATORVASTATIN CALCIUM 40 MG/1
TABLET, FILM COATED ORAL
Qty: 90 TABLET | Refills: 3 | Status: SHIPPED | OUTPATIENT
Start: 2022-04-20 | End: 2023-01-17

## 2022-04-20 NOTE — PROGRESS NOTES
Patient ID: Willis Camargo is a 72 y.o. male.     Chief Complaint: Medication Refill    HPI   patient here accompanied by wife    Tobacco use- still smoking, approx 1/2 ppd. Plans to quit by the end of the year. Not interested in starting chantix yet. He has tried nicotine patches and these did not work for him. He is enrolled in smoking cessation program.     History of stroke- still have occasional mild memory deficits. Taking asa and statin.     Vit d deficiency- taking weekly vitamin d    Review of Systems  Review of Systems   Constitutional: Negative for fever.   HENT: Negative for ear pain and sinus pain.    Eyes: Negative for discharge.   Respiratory: Negative for cough and shortness of breath.    Cardiovascular: Negative for chest pain and leg swelling.   Gastrointestinal: Negative for diarrhea, nausea and vomiting.   Genitourinary: Negative for urgency.   Musculoskeletal: Negative for myalgias.   Skin: Negative for rash.   Neurological: Negative for weakness and headaches.   Psychiatric/Behavioral: Negative for depression.   All other systems reviewed and are negative.      Currently Medications  Current Outpatient Medications on File Prior to Visit   Medication Sig Dispense Refill    [DISCONTINUED] atorvastatin (LIPITOR) 40 MG tablet TAKE 1 TABLET(40 MG TOTAL) BY MOUTH ONCE DAILY 90 tablet 3    [DISCONTINUED] ergocalciferol (ERGOCALCIFEROL) 50,000 unit Cap Take 1 capsule (50,000 Units total) by mouth every 7 days. 12 capsule 1    acetaminophen (TYLENOL ARTHRITIS PAIN ORAL) Take 2 tablets by mouth as needed.      clopidogreL (PLAVIX) 75 mg tablet Take 1 tablet (75 mg total) by mouth once daily. (Patient not taking: Reported on 8/12/2021) 30 tablet 11    [DISCONTINUED] aspirin 325 MG tablet Take 1 tablet (325 mg total) by mouth once daily. 30 tablet 11    [DISCONTINUED] nicotine (NICODERM CQ) 21 mg/24 hr Place 1 patch onto the skin once daily. (Patient not taking: Reported on 4/20/2022) 28 patch 0     No  "current facility-administered medications on file prior to visit.       Physical  Exam  Vitals:    04/20/22 0956   BP: 138/82   BP Location: Right arm   Patient Position: Sitting   Pulse: 107   Temp: 97.4 °F (36.3 °C)   SpO2: 95%   Weight: 90.9 kg (200 lb 4.6 oz)   Height: 6' 2" (1.88 m)      Body mass index is 25.72 kg/m².    Physical Exam  Vitals and nursing note reviewed.   Constitutional:       General: He is not in acute distress.     Appearance: He is not ill-appearing.   HENT:      Head: Normocephalic and atraumatic.      Right Ear: External ear normal.      Left Ear: External ear normal.      Nose: Nose normal.      Mouth/Throat:      Mouth: Mucous membranes are moist.   Eyes:      Extraocular Movements: Extraocular movements intact.      Conjunctiva/sclera: Conjunctivae normal.   Cardiovascular:      Rate and Rhythm: Normal rate and regular rhythm.      Pulses: Normal pulses.      Heart sounds: No murmur heard.  Pulmonary:      Effort: Pulmonary effort is normal. No respiratory distress.      Breath sounds: No wheezing.   Abdominal:      General: There is no distension.      Palpations: Abdomen is soft. There is no mass.      Tenderness: There is no abdominal tenderness.   Musculoskeletal:         General: No swelling.      Cervical back: Normal range of motion.   Skin:     Coloration: Skin is not jaundiced.      Findings: No rash.   Neurological:      General: No focal deficit present.      Mental Status: He is alert and oriented to person, place, and time.   Psychiatric:         Mood and Affect: Mood normal.         Thought Content: Thought content normal.         Labs:    Complete Blood Count  Lab Results   Component Value Date    RBC 6.39 (H) 03/05/2021    HGB 14.5 03/05/2021    HCT 48.8 03/05/2021    MCV 76 (L) 03/05/2021    MCH 22.7 (L) 03/05/2021    MCHC 29.7 (L) 03/05/2021    RDW 16.1 (H) 03/05/2021     03/05/2021    MPV SEE COMMENT 03/05/2021    GRAN 1.9 03/05/2021    GRAN 42.5 03/05/2021    " LYMPH 1.7 03/05/2021    LYMPH 38.0 03/05/2021    MONO 0.5 03/05/2021    MONO 10.0 03/05/2021    EOS 0.4 03/05/2021    BASO 0.02 03/05/2021    EOSINOPHIL 8.9 (H) 03/05/2021    BASOPHIL 0.4 03/05/2021    DIFFMETHOD Automated 03/05/2021       Comprehensive Metabolic Panel  No results found for: GLU, BUN, CREATININE, NA, K, CL, PROT, ALBUMIN, BILITOT, AST, ALKPHOS, CO2, ALT, ANIONGAP, EGFRNONAA, ESTGFRAFRICA    TSH  No results found for: TSH    Imaging:  Cardiac event monitor  · Symptoms corresponding with normal sinus rhythm.  · Negative event monitor with no clinical arrhythmias.      2 events were transmitted. 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       Other    Tobacco abuse      Other Visit Diagnoses     Hyperglycemia    -  Primary    Relevant Orders    Hemoglobin A1C    Vitamin D deficiency        Relevant Medications    ergocalciferol (ERGOCALCIFEROL) 50,000 unit Cap    Prostate cancer screening        Relevant Orders    PSA, Screening    Screening for cardiovascular condition        Relevant Orders    Lipid Panel    Cerebrovascular accident (CVA), unspecified mechanism        Relevant Orders    CBC Auto Differential    Comprehensive Metabolic Panel           Discussed how to stay healthy including: diet, exercise, refraining from smoking and discussed screening exams / tests needed for age, sex and family Hx.    RTC 6 mo    Derrek Vigil MD

## 2022-04-21 ENCOUNTER — LAB VISIT (OUTPATIENT)
Dept: LAB | Facility: HOSPITAL | Age: 72
End: 2022-04-21
Attending: STUDENT IN AN ORGANIZED HEALTH CARE EDUCATION/TRAINING PROGRAM
Payer: MEDICARE

## 2022-04-21 DIAGNOSIS — Z12.5 PROSTATE CANCER SCREENING: ICD-10-CM

## 2022-04-21 DIAGNOSIS — I63.9 CEREBROVASCULAR ACCIDENT (CVA), UNSPECIFIED MECHANISM: ICD-10-CM

## 2022-04-21 DIAGNOSIS — Z13.6 SCREENING FOR CARDIOVASCULAR CONDITION: ICD-10-CM

## 2022-04-21 DIAGNOSIS — R73.9 HYPERGLYCEMIA: ICD-10-CM

## 2022-04-21 LAB
ALBUMIN SERPL BCP-MCNC: 4.4 G/DL (ref 3.5–5.2)
ALP SERPL-CCNC: 79 U/L (ref 38–126)
ALT SERPL W/O P-5'-P-CCNC: 27 U/L (ref 10–44)
ANION GAP SERPL CALC-SCNC: 11 MMOL/L (ref 8–16)
AST SERPL-CCNC: 26 U/L (ref 15–46)
BASOPHILS # BLD AUTO: 0.06 K/UL (ref 0–0.2)
BASOPHILS NFR BLD: 1.1 % (ref 0–1.9)
BILIRUB SERPL-MCNC: 0.5 MG/DL (ref 0.1–1)
CALCIUM SERPL-MCNC: 9.2 MG/DL (ref 8.7–10.5)
CHLORIDE SERPL-SCNC: 103 MMOL/L (ref 95–110)
CHOLEST SERPL-MCNC: 159 MG/DL (ref 120–199)
CHOLEST/HDLC SERPL: 4.5 {RATIO} (ref 2–5)
CO2 SERPL-SCNC: 29 MMOL/L (ref 23–29)
COMPLEXED PSA SERPL-MCNC: 4 NG/ML (ref 0–4)
CREAT SERPL-MCNC: 1.04 MG/DL (ref 0.5–1.4)
DIFFERENTIAL METHOD: ABNORMAL
EOSINOPHIL # BLD AUTO: 0.2 K/UL (ref 0–0.5)
EOSINOPHIL NFR BLD: 3.4 % (ref 0–8)
ERYTHROCYTE [DISTWIDTH] IN BLOOD BY AUTOMATED COUNT: 16.2 % (ref 11.5–14.5)
EST. GFR  (AFRICAN AMERICAN): >60 ML/MIN/1.73 M^2
EST. GFR  (NON AFRICAN AMERICAN): >60 ML/MIN/1.73 M^2
ESTIMATED AVG GLUCOSE: 123 MG/DL (ref 68–131)
GLUCOSE SERPL-MCNC: 131 MG/DL (ref 70–110)
HBA1C MFR BLD: 5.9 % (ref 4–5.6)
HCT VFR BLD AUTO: 50.3 % (ref 40–54)
HDLC SERPL-MCNC: 35 MG/DL (ref 40–75)
HDLC SERPL: 22 % (ref 20–50)
HGB BLD-MCNC: 15.1 G/DL (ref 14–18)
IMM GRANULOCYTES # BLD AUTO: 0.02 K/UL (ref 0–0.04)
IMM GRANULOCYTES NFR BLD AUTO: 0.4 % (ref 0–0.5)
LDLC SERPL CALC-MCNC: 73.6 MG/DL (ref 63–159)
LYMPHOCYTES # BLD AUTO: 2.6 K/UL (ref 1–4.8)
LYMPHOCYTES NFR BLD: 46.9 % (ref 18–48)
MCH RBC QN AUTO: 22.6 PG (ref 27–31)
MCHC RBC AUTO-ENTMCNC: 30 G/DL (ref 32–36)
MCV RBC AUTO: 75 FL (ref 82–98)
MONOCYTES # BLD AUTO: 0.5 K/UL (ref 0.3–1)
MONOCYTES NFR BLD: 9.2 % (ref 4–15)
NEUTROPHILS # BLD AUTO: 2.2 K/UL (ref 1.8–7.7)
NEUTROPHILS NFR BLD: 39 % (ref 38–73)
NONHDLC SERPL-MCNC: 124 MG/DL
NRBC BLD-RTO: 0 /100 WBC
PLATELET # BLD AUTO: 228 K/UL (ref 150–450)
PMV BLD AUTO: ABNORMAL FL (ref 9.2–12.9)
POTASSIUM SERPL-SCNC: 4.8 MMOL/L (ref 3.5–5.1)
PROT SERPL-MCNC: 8.1 G/DL (ref 6–8.4)
RBC # BLD AUTO: 6.67 M/UL (ref 4.6–6.2)
SODIUM SERPL-SCNC: 143 MMOL/L (ref 136–145)
TRIGL SERPL-MCNC: 252 MG/DL (ref 30–150)
UUN UR-MCNC: 11 MG/DL (ref 2–20)
WBC # BLD AUTO: 5.57 K/UL (ref 3.9–12.7)

## 2022-04-21 PROCEDURE — 80053 COMPREHEN METABOLIC PANEL: CPT | Mod: PO | Performed by: STUDENT IN AN ORGANIZED HEALTH CARE EDUCATION/TRAINING PROGRAM

## 2022-04-21 PROCEDURE — 85025 COMPLETE CBC W/AUTO DIFF WBC: CPT | Mod: PO | Performed by: STUDENT IN AN ORGANIZED HEALTH CARE EDUCATION/TRAINING PROGRAM

## 2022-04-21 PROCEDURE — 36415 COLL VENOUS BLD VENIPUNCTURE: CPT | Mod: PO | Performed by: STUDENT IN AN ORGANIZED HEALTH CARE EDUCATION/TRAINING PROGRAM

## 2022-04-21 PROCEDURE — 84153 ASSAY OF PSA TOTAL: CPT | Performed by: STUDENT IN AN ORGANIZED HEALTH CARE EDUCATION/TRAINING PROGRAM

## 2022-04-21 PROCEDURE — 80061 LIPID PANEL: CPT | Performed by: STUDENT IN AN ORGANIZED HEALTH CARE EDUCATION/TRAINING PROGRAM

## 2022-04-21 PROCEDURE — 83036 HEMOGLOBIN GLYCOSYLATED A1C: CPT | Performed by: STUDENT IN AN ORGANIZED HEALTH CARE EDUCATION/TRAINING PROGRAM

## 2022-04-22 DIAGNOSIS — R73.03 PREDIABETES: Primary | ICD-10-CM

## 2022-04-22 DIAGNOSIS — E78.1 HYPERTRIGLYCERIDEMIA: ICD-10-CM

## 2022-09-01 ENCOUNTER — TELEPHONE (OUTPATIENT)
Dept: SMOKING CESSATION | Facility: CLINIC | Age: 72
End: 2022-09-01
Payer: MEDICARE

## 2022-09-27 ENCOUNTER — CLINICAL SUPPORT (OUTPATIENT)
Dept: SMOKING CESSATION | Facility: CLINIC | Age: 72
End: 2022-09-27
Payer: COMMERCIAL

## 2022-09-27 DIAGNOSIS — F17.200 NICOTINE DEPENDENCE: Primary | ICD-10-CM

## 2022-09-27 PROCEDURE — 99999 PR PBB SHADOW E&M-EST. PATIENT-LVL I: CPT | Mod: PBBFAC,,,

## 2022-09-27 PROCEDURE — 99406 BEHAV CHNG SMOKING 3-10 MIN: CPT | Mod: S$GLB,,,

## 2022-09-27 PROCEDURE — 99999 PR PBB SHADOW E&M-EST. PATIENT-LVL I: ICD-10-PCS | Mod: PBBFAC,,,

## 2022-09-27 PROCEDURE — 99406 PR TOBACCO USE CESSATION INTERMEDIATE 3-10 MINUTES: ICD-10-PCS | Mod: S$GLB,,,

## 2022-10-15 DIAGNOSIS — E55.9 VITAMIN D DEFICIENCY: ICD-10-CM

## 2022-10-16 RX ORDER — ERGOCALCIFEROL 1.25 MG/1
CAPSULE ORAL
Qty: 12 CAPSULE | Refills: 1 | Status: SHIPPED | OUTPATIENT
Start: 2022-10-16 | End: 2023-01-17

## 2023-04-25 DIAGNOSIS — E55.9 VITAMIN D DEFICIENCY: ICD-10-CM

## 2023-04-25 RX ORDER — ERGOCALCIFEROL 1.25 MG/1
CAPSULE ORAL
Qty: 12 CAPSULE | Refills: 1 | Status: SHIPPED | OUTPATIENT
Start: 2023-04-25 | End: 2024-02-27 | Stop reason: SDUPTHER

## 2023-05-25 ENCOUNTER — OFFICE VISIT (OUTPATIENT)
Dept: FAMILY MEDICINE | Facility: CLINIC | Age: 73
End: 2023-05-25
Payer: MEDICARE

## 2023-05-25 VITALS
HEIGHT: 74 IN | TEMPERATURE: 98 F | BODY MASS INDEX: 26.01 KG/M2 | OXYGEN SATURATION: 95 % | DIASTOLIC BLOOD PRESSURE: 70 MMHG | HEART RATE: 81 BPM | WEIGHT: 202.69 LBS | SYSTOLIC BLOOD PRESSURE: 124 MMHG

## 2023-05-25 DIAGNOSIS — H91.90 HEARING LOSS, UNSPECIFIED HEARING LOSS TYPE, UNSPECIFIED LATERALITY: ICD-10-CM

## 2023-05-25 DIAGNOSIS — Z12.5 PROSTATE CANCER SCREENING: ICD-10-CM

## 2023-05-25 DIAGNOSIS — Z13.6 SCREENING FOR CARDIOVASCULAR CONDITION: ICD-10-CM

## 2023-05-25 DIAGNOSIS — R61 NIGHT SWEATS: Primary | ICD-10-CM

## 2023-05-25 DIAGNOSIS — R73.9 HYPERGLYCEMIA: ICD-10-CM

## 2023-05-25 PROCEDURE — 99214 PR OFFICE/OUTPT VISIT, EST, LEVL IV, 30-39 MIN: ICD-10-PCS | Mod: S$GLB,,, | Performed by: STUDENT IN AN ORGANIZED HEALTH CARE EDUCATION/TRAINING PROGRAM

## 2023-05-25 PROCEDURE — 99214 OFFICE O/P EST MOD 30 MIN: CPT | Mod: S$GLB,,, | Performed by: STUDENT IN AN ORGANIZED HEALTH CARE EDUCATION/TRAINING PROGRAM

## 2023-05-26 ENCOUNTER — PATIENT OUTREACH (OUTPATIENT)
Dept: ADMINISTRATIVE | Facility: OTHER | Age: 73
End: 2023-05-26
Payer: MEDICARE

## 2023-05-26 NOTE — PROGRESS NOTES
CHW - Initial Contact    This Community Health Worker completed the Social Determinant of Health questionnaire with MRN [id number] in person today.    Pt identified barriers of most importance are: No barriers reported   Referrals to community agencies completed with patient/caregiver consent outside of [facility] include: No  Referrals were put through [facility] - No  Support and Services: No support & services have been documented.  Other information discussed the patient needs / wants help with: Possible assistance is required, follow up outreach will be completed for second assessment of patient's needs.   Follow up required: Yes, final outreach   No future outreach task assigned

## 2023-05-28 NOTE — PROGRESS NOTES
" Patient ID: Willis Faye is a 73 y.o. male.     Chief Complaint: Annual Exam    HPI   Patient here for annual wellness exam. He is accompanied by his wife. He complains of night sweats. These happen intermittently. They do not happen every night. He is not losing weight. He has not noticed any enlarged lymph nodes.     Review of Systems  Review of Systems   Constitutional:  Negative for fever.   HENT:  Negative for ear pain and sinus pain.    Eyes:  Negative for discharge.   Respiratory:  Negative for cough and shortness of breath.    Cardiovascular:  Negative for chest pain and leg swelling.   Gastrointestinal:  Negative for diarrhea, nausea and vomiting.   Genitourinary:  Negative for urgency.   Musculoskeletal:  Negative for myalgias.   Skin:  Negative for rash.   Neurological:  Negative for weakness and headaches.   Psychiatric/Behavioral:  Negative for depression.    All other systems reviewed and are negative.    Currently Medications  Current Outpatient Medications on File Prior to Visit   Medication Sig Dispense Refill    acetaminophen (TYLENOL ARTHRITIS PAIN ORAL) Take 2 tablets by mouth as needed.      aspirin 325 MG tablet Take 1 tablet (325 mg total) by mouth once daily. 30 tablet 11    atorvastatin (LIPITOR) 40 MG tablet TAKE 1 TABLET(40 MG) BY MOUTH EVERY DAY 90 tablet 1    ergocalciferol (ERGOCALCIFEROL) 50,000 unit Cap TAKE 1 CAPSULE BY MOUTH EVERY 7 DAYS 12 capsule 1    clopidogreL (PLAVIX) 75 mg tablet Take 1 tablet (75 mg total) by mouth once daily. (Patient not taking: Reported on 8/12/2021) 30 tablet 11     No current facility-administered medications on file prior to visit.       Physical  Exam  Vitals:    05/25/23 1520   BP: 124/70   BP Location: Left arm   Patient Position: Sitting   Pulse: 81   Temp: 97.7 °F (36.5 °C)   TempSrc: Oral   SpO2: 95%   Weight: 92 kg (202 lb 11.4 oz)   Height: 6' 2" (1.88 m)      Body mass index is 26.03 kg/m².  Wt Readings from Last 3 Encounters:   05/25/23 92 kg " (202 lb 11.4 oz)   04/20/22 90.9 kg (200 lb 4.6 oz)   08/12/21 86.7 kg (191 lb 2.2 oz)       Physical Exam  Vitals and nursing note reviewed.   Constitutional:       General: He is not in acute distress.     Appearance: He is not ill-appearing.   HENT:      Head: Normocephalic and atraumatic.      Right Ear: External ear normal.      Left Ear: External ear normal.      Nose: Nose normal.      Mouth/Throat:      Mouth: Mucous membranes are moist.   Eyes:      Extraocular Movements: Extraocular movements intact.      Conjunctiva/sclera: Conjunctivae normal.   Cardiovascular:      Rate and Rhythm: Normal rate and regular rhythm.      Pulses: Normal pulses.      Heart sounds: No murmur heard.  Pulmonary:      Effort: Pulmonary effort is normal. No respiratory distress.      Breath sounds: No wheezing.   Abdominal:      General: There is no distension.      Palpations: Abdomen is soft. There is no mass.      Tenderness: There is no abdominal tenderness.   Musculoskeletal:         General: No swelling.      Cervical back: Normal range of motion.   Skin:     Coloration: Skin is not jaundiced.      Findings: No rash.   Neurological:      General: No focal deficit present.      Mental Status: He is alert and oriented to person, place, and time.   Psychiatric:         Mood and Affect: Mood normal.         Thought Content: Thought content normal.       Labs:    Complete Blood Count  Lab Results   Component Value Date    RBC 6.67 (H) 04/21/2022    HGB 15.1 04/21/2022    HCT 50.3 04/21/2022    MCV 75 (L) 04/21/2022    MCH 22.6 (L) 04/21/2022    MCHC 30.0 (L) 04/21/2022    RDW 16.2 (H) 04/21/2022     04/21/2022    MPV SEE COMMENT 04/21/2022    GRAN 2.2 04/21/2022    GRAN 39.0 04/21/2022    LYMPH 2.6 04/21/2022    LYMPH 46.9 04/21/2022    MONO 0.5 04/21/2022    MONO 9.2 04/21/2022    EOS 0.2 04/21/2022    BASO 0.06 04/21/2022    EOSINOPHIL 3.4 04/21/2022    BASOPHIL 1.1 04/21/2022    DIFFMETHOD Automated 04/21/2022        Comprehensive Metabolic Panel  No results found for: GLU, BUN, CREATININE, NA, K, CL, PROT, ALBUMIN, BILITOT, AST, ALKPHOS, CO2, ALT, ANIONGAP, EGFRNONAA, ESTGFRAFRICA    TSH  No results found for: TSH    Imaging:  Cardiac event monitor  · Symptoms corresponding with normal sinus rhythm.  · Negative event monitor with no clinical arrhythmias.      2 events were transmitted. 0 symptomatic; 2 device triggered   Patient monitored for 28d 6h 11m   7,156 PACs with PAC burden of <1%   15,442 PVCs with PVC burden of 1%      Assessment/Plan:    1. Night sweats  -     CBC Auto Differential; Future  -     Comprehensive metabolic panel; Future; Expected date: 05/25/2023  -     TSH; Future; Expected date: 05/25/2023  -     QUANTIFERON GOLD TB; Future; Expected date: 05/25/2023    2. Hyperglycemia  -     HEMOGLOBIN A1C; Future; Expected date: 05/25/2023    3. Screening for cardiovascular condition  -     LIPID PANEL; Future; Expected date: 05/25/2023    4. Prostate cancer screening  -     PSA, SCREENING; Future; Expected date: 05/25/2023    5. Hearing loss, unspecified hearing loss type, unspecified laterality  -     Ambulatory referral/consult to ENT; Future; Expected date: 06/01/2023  -     Ambulatory referral/consult to Audiology; Future; Expected date: 06/01/2023         Discussed how to stay healthy including: diet, exercise, refraining from smoking and discussed screening exams / tests needed for age, sex and family Hx.    RTC pending labs    Derrek Vigil MD

## 2023-06-01 ENCOUNTER — LAB VISIT (OUTPATIENT)
Dept: LAB | Facility: HOSPITAL | Age: 73
End: 2023-06-01
Attending: STUDENT IN AN ORGANIZED HEALTH CARE EDUCATION/TRAINING PROGRAM
Payer: MEDICARE

## 2023-06-01 DIAGNOSIS — R73.9 HYPERGLYCEMIA: ICD-10-CM

## 2023-06-01 DIAGNOSIS — Z13.6 SCREENING FOR CARDIOVASCULAR CONDITION: ICD-10-CM

## 2023-06-01 DIAGNOSIS — Z12.5 PROSTATE CANCER SCREENING: ICD-10-CM

## 2023-06-01 DIAGNOSIS — R61 NIGHT SWEATS: ICD-10-CM

## 2023-06-01 LAB
ALBUMIN SERPL BCP-MCNC: 4.3 G/DL (ref 3.5–5.2)
ALP SERPL-CCNC: 66 U/L (ref 38–126)
ALT SERPL W/O P-5'-P-CCNC: 19 U/L (ref 10–44)
ANION GAP SERPL CALC-SCNC: 8 MMOL/L (ref 8–16)
AST SERPL-CCNC: 25 U/L (ref 15–46)
BASOPHILS # BLD AUTO: 0.02 K/UL (ref 0–0.2)
BASOPHILS NFR BLD: 0.5 % (ref 0–1.9)
BILIRUB SERPL-MCNC: 0.6 MG/DL (ref 0.1–1)
CALCIUM SERPL-MCNC: 9.3 MG/DL (ref 8.7–10.5)
CHLORIDE SERPL-SCNC: 106 MMOL/L (ref 95–110)
CHOLEST SERPL-MCNC: 163 MG/DL (ref 120–199)
CHOLEST/HDLC SERPL: 5.1 {RATIO} (ref 2–5)
CO2 SERPL-SCNC: 30 MMOL/L (ref 23–29)
COMPLEXED PSA SERPL-MCNC: 5.8 NG/ML (ref 0–4)
CREAT SERPL-MCNC: 1.08 MG/DL (ref 0.5–1.4)
DIFFERENTIAL METHOD: ABNORMAL
EOSINOPHIL # BLD AUTO: 0.4 K/UL (ref 0–0.5)
EOSINOPHIL NFR BLD: 9.5 % (ref 0–8)
ERYTHROCYTE [DISTWIDTH] IN BLOOD BY AUTOMATED COUNT: 16.7 % (ref 11.5–14.5)
EST. GFR  (NO RACE VARIABLE): >60 ML/MIN/1.73 M^2
ESTIMATED AVG GLUCOSE: 120 MG/DL (ref 68–131)
GLUCOSE SERPL-MCNC: 121 MG/DL (ref 70–110)
HBA1C MFR BLD: 5.8 % (ref 4–5.6)
HCT VFR BLD AUTO: 51.5 % (ref 40–54)
HDLC SERPL-MCNC: 32 MG/DL (ref 40–75)
HDLC SERPL: 19.6 % (ref 20–50)
HGB BLD-MCNC: 15.7 G/DL (ref 14–18)
IMM GRANULOCYTES # BLD AUTO: 0.01 K/UL (ref 0–0.04)
IMM GRANULOCYTES NFR BLD AUTO: 0.3 % (ref 0–0.5)
LDLC SERPL CALC-MCNC: 81.6 MG/DL (ref 63–159)
LYMPHOCYTES # BLD AUTO: 1.9 K/UL (ref 1–4.8)
LYMPHOCYTES NFR BLD: 48.8 % (ref 18–48)
MCH RBC QN AUTO: 23 PG (ref 27–31)
MCHC RBC AUTO-ENTMCNC: 30.5 G/DL (ref 32–36)
MCV RBC AUTO: 75 FL (ref 82–98)
MONOCYTES # BLD AUTO: 0.3 K/UL (ref 0.3–1)
MONOCYTES NFR BLD: 8.7 % (ref 4–15)
NEUTROPHILS # BLD AUTO: 1.2 K/UL (ref 1.8–7.7)
NEUTROPHILS NFR BLD: 32.2 % (ref 38–73)
NONHDLC SERPL-MCNC: 131 MG/DL
NRBC BLD-RTO: 0 /100 WBC
PLATELET # BLD AUTO: 194 K/UL (ref 150–450)
PMV BLD AUTO: ABNORMAL FL (ref 9.2–12.9)
POTASSIUM SERPL-SCNC: 4.5 MMOL/L (ref 3.5–5.1)
PROT SERPL-MCNC: 7.6 G/DL (ref 6–8.4)
RBC # BLD AUTO: 6.84 M/UL (ref 4.6–6.2)
SODIUM SERPL-SCNC: 144 MMOL/L (ref 136–145)
TRIGL SERPL-MCNC: 247 MG/DL (ref 30–150)
TSH SERPL DL<=0.005 MIU/L-ACNC: 1.91 UIU/ML (ref 0.4–4)
UUN UR-MCNC: 9 MG/DL (ref 2–20)
WBC # BLD AUTO: 3.79 K/UL (ref 3.9–12.7)

## 2023-06-01 PROCEDURE — 85025 COMPLETE CBC W/AUTO DIFF WBC: CPT | Mod: PO | Performed by: STUDENT IN AN ORGANIZED HEALTH CARE EDUCATION/TRAINING PROGRAM

## 2023-06-01 PROCEDURE — 84153 ASSAY OF PSA TOTAL: CPT | Performed by: STUDENT IN AN ORGANIZED HEALTH CARE EDUCATION/TRAINING PROGRAM

## 2023-06-01 PROCEDURE — 80053 COMPREHEN METABOLIC PANEL: CPT | Mod: PO | Performed by: STUDENT IN AN ORGANIZED HEALTH CARE EDUCATION/TRAINING PROGRAM

## 2023-06-01 PROCEDURE — 80061 LIPID PANEL: CPT | Performed by: STUDENT IN AN ORGANIZED HEALTH CARE EDUCATION/TRAINING PROGRAM

## 2023-06-01 PROCEDURE — 83036 HEMOGLOBIN GLYCOSYLATED A1C: CPT | Performed by: STUDENT IN AN ORGANIZED HEALTH CARE EDUCATION/TRAINING PROGRAM

## 2023-06-01 PROCEDURE — 86480 TB TEST CELL IMMUN MEASURE: CPT | Mod: PO | Performed by: STUDENT IN AN ORGANIZED HEALTH CARE EDUCATION/TRAINING PROGRAM

## 2023-06-01 PROCEDURE — 36415 COLL VENOUS BLD VENIPUNCTURE: CPT | Mod: PO | Performed by: STUDENT IN AN ORGANIZED HEALTH CARE EDUCATION/TRAINING PROGRAM

## 2023-06-01 PROCEDURE — 84443 ASSAY THYROID STIM HORMONE: CPT | Mod: PO | Performed by: STUDENT IN AN ORGANIZED HEALTH CARE EDUCATION/TRAINING PROGRAM

## 2023-06-02 LAB
GAMMA INTERFERON BACKGROUND BLD IA-ACNC: 0.16 IU/ML
M TB IFN-G CD4+ BCKGRND COR BLD-ACNC: -0.02 IU/ML
MITOGEN IGNF BCKGRD COR BLD-ACNC: 9.84 IU/ML
TB GOLD PLUS: NEGATIVE
TB2 - NIL: -0.04 IU/ML

## 2023-06-03 DIAGNOSIS — Z12.5 PROSTATE CANCER SCREENING: Primary | ICD-10-CM

## 2023-08-02 ENCOUNTER — TELEPHONE (OUTPATIENT)
Dept: OTOLARYNGOLOGY | Facility: CLINIC | Age: 73
End: 2023-08-02
Payer: MEDICARE

## 2023-08-02 ENCOUNTER — PATIENT MESSAGE (OUTPATIENT)
Dept: OTOLARYNGOLOGY | Facility: CLINIC | Age: 73
End: 2023-08-02
Payer: MEDICARE

## 2023-08-02 NOTE — TELEPHONE ENCOUNTER
Returned call. Apt rescheduled to August 16th at 1PM.     ----- Message from Joann Rowell sent at 8/2/2023 10:21 AM CDT -----  Type:  Patient Returning Call    Who Called: Pt  Who Left Message for Patient: Landry  Does the patient know what this is regarding?: Appt reschedule   Would the patient rather a call back or a response via [facility]?  call  Best Call Back Number: [phone]  Additional Information:

## 2023-11-27 PROBLEM — Z12.11 SCREENING FOR MALIGNANT NEOPLASM OF COLON: Status: RESOLVED | Noted: 2021-02-04 | Resolved: 2023-11-27

## 2023-11-27 PROBLEM — E55.9 VITAMIN D DEFICIENCY: Status: ACTIVE | Noted: 2023-11-27

## 2023-11-27 PROBLEM — I65.22 ATHEROSCLEROSIS OF LEFT CAROTID ARTERY: Status: ACTIVE | Noted: 2023-11-27

## 2023-11-27 PROBLEM — R97.20 ELEVATED PSA: Status: ACTIVE | Noted: 2023-11-27

## 2023-12-04 NOTE — TELEPHONE ENCOUNTER
No care due was identified.  [facility] Embedded Care Due Messages. Reference number: [id number].   12/04/2023 4:21:12 PM CST

## 2023-12-04 NOTE — TELEPHONE ENCOUNTER
----- Message from Gonzalo Woo sent at 12/4/2023  3:48 PM CST -----  Contact: Pt  .Type:  Needs Medical Advice    Who Called: pt    Pharmacy name and phone #:  [id number] - [place] [facility] [address] AT [facility] [address]   Phone: [phone]  Fax: [phone]  Would the patient rather a call back or a response via [facility]?  Call back  Best Call Back Number: [phone]  Additional Information:  Pt. Needs a refill on his atorvastatin (LIPITOR) 40 MG tablet.  Pt. Does not have any more medications

## 2023-12-05 RX ORDER — ATORVASTATIN CALCIUM 40 MG/1
TABLET, FILM COATED ORAL
Qty: 90 TABLET | Refills: 1 | Status: SHIPPED | OUTPATIENT
Start: 2023-12-05 | End: 2023-12-08 | Stop reason: SDUPTHER

## 2023-12-06 ENCOUNTER — TELEPHONE (OUTPATIENT)
Dept: ADMINISTRATIVE | Facility: CLINIC | Age: 73
End: 2023-12-06
Payer: MEDICARE

## 2023-12-08 RX ORDER — ATORVASTATIN CALCIUM 40 MG/1
TABLET, FILM COATED ORAL
Qty: 90 TABLET | Refills: 1 | Status: SHIPPED | OUTPATIENT
Start: 2023-12-08

## 2023-12-08 NOTE — TELEPHONE ENCOUNTER
No care due was identified.  [facility] Embedded Care Due Messages. Reference number: [id number].   12/08/2023 8:16:43 AM CST

## 2024-02-27 DIAGNOSIS — E55.9 VITAMIN D DEFICIENCY: ICD-10-CM

## 2024-02-27 RX ORDER — ERGOCALCIFEROL 1.25 MG/1
50000 CAPSULE ORAL
Qty: 12 CAPSULE | Refills: 1 | Status: SHIPPED | OUTPATIENT
Start: 2024-02-27 | End: 2024-02-28 | Stop reason: SDUPTHER

## 2024-02-27 NOTE — TELEPHONE ENCOUNTER
Care Due:                  Date            Visit Type   Department     Provider  --------------------------------------------------------------------------------                                EP -                              PRIMARY      [facility] FAMILY  Last Visit: 05-      CARE ([facility])   MEDICINE       Derrek Vigil  Next Visit: None Scheduled  None         None Found                                                            Last  Test          Frequency    Reason                     Performed    Due Date  --------------------------------------------------------------------------------    Office Visit  12 months..  ergocalciferol...........  05- 05-    CMP.........  12 months..  atorvastatin,              06- 05-                             ergocalciferol...........    Lipid Panel.  12 months..  atorvastatin.............  06- 05-    Vitamin D...  12 months..  ergocalciferol...........  Not Found    Overdue    [employer] Embedded Care Due Messages. Reference number: [id number].   2/27/2024 10:03:35 AM CST

## 2024-02-28 DIAGNOSIS — E55.9 VITAMIN D DEFICIENCY: ICD-10-CM

## 2024-02-28 RX ORDER — ERGOCALCIFEROL 1.25 MG/1
50000 CAPSULE ORAL
Qty: 12 CAPSULE | Refills: 1 | Status: SHIPPED | OUTPATIENT
Start: 2024-02-28

## 2024-02-28 NOTE — TELEPHONE ENCOUNTER
----- Message from Coreynghia Ochoa sent at 2/28/2024 10:16 AM CST -----  Contact: Ms. peralta  Type: Requesting to speak with nurse        Who Called: Ms. Peralta (caretaker)  Regarding: vitamin d   Would the patient rather a call back or a response via [facility]? Call back  Best Call Back Number:  [phone]  Additional Information: [id number] - [address] [facility] AT [facility] [address]   Phone: [phone]  Fax: [phone]

## 2024-02-28 NOTE — TELEPHONE ENCOUNTER
No care due was identified.  [facility] Embedded Care Due Messages. Reference number: [id number].   2/28/2024 10:33:26 AM CST

## 2024-07-22 ENCOUNTER — OFFICE VISIT (OUTPATIENT)
Dept: FAMILY MEDICINE | Facility: CLINIC | Age: 74
End: 2024-07-22
Payer: MEDICARE

## 2024-07-22 VITALS
HEIGHT: 74 IN | HEART RATE: 99 BPM | WEIGHT: 201.81 LBS | SYSTOLIC BLOOD PRESSURE: 136 MMHG | DIASTOLIC BLOOD PRESSURE: 86 MMHG | BODY MASS INDEX: 25.9 KG/M2 | OXYGEN SATURATION: 96 %

## 2024-07-22 DIAGNOSIS — I73.89 OTHER SPECIFIED PERIPHERAL VASCULAR DISEASES: ICD-10-CM

## 2024-07-22 DIAGNOSIS — H91.90 HEARING LOSS, UNSPECIFIED HEARING LOSS TYPE, UNSPECIFIED LATERALITY: Primary | ICD-10-CM

## 2024-07-22 DIAGNOSIS — Z87.891 PERSONAL HISTORY OF NICOTINE DEPENDENCE: ICD-10-CM

## 2024-07-22 DIAGNOSIS — Z13.6 SCREENING FOR CARDIOVASCULAR CONDITION: ICD-10-CM

## 2024-07-22 DIAGNOSIS — R79.9 ABNORMAL FINDING OF BLOOD CHEMISTRY, UNSPECIFIED: ICD-10-CM

## 2024-07-22 DIAGNOSIS — Z12.5 PROSTATE CANCER SCREENING: ICD-10-CM

## 2024-07-22 DIAGNOSIS — Z79.899 OTHER LONG TERM (CURRENT) DRUG THERAPY: ICD-10-CM

## 2024-07-22 DIAGNOSIS — N52.9 ERECTILE DYSFUNCTION, UNSPECIFIED ERECTILE DYSFUNCTION TYPE: ICD-10-CM

## 2024-07-22 DIAGNOSIS — H53.8 BLURRY VISION, BILATERAL: ICD-10-CM

## 2024-07-22 DIAGNOSIS — H61.23 IMPACTED CERUMEN OF BOTH EARS: ICD-10-CM

## 2024-07-22 DIAGNOSIS — R73.9 HYPERGLYCEMIA: ICD-10-CM

## 2024-07-22 DIAGNOSIS — E55.9 VITAMIN D DEFICIENCY: ICD-10-CM

## 2024-07-22 PROCEDURE — 69209 REMOVE IMPACTED EAR WAX UNI: CPT | Mod: 50,S$GLB,, | Performed by: STUDENT IN AN ORGANIZED HEALTH CARE EDUCATION/TRAINING PROGRAM

## 2024-07-22 PROCEDURE — 99215 OFFICE O/P EST HI 40 MIN: CPT | Mod: 25,S$GLB,, | Performed by: STUDENT IN AN ORGANIZED HEALTH CARE EDUCATION/TRAINING PROGRAM

## 2024-07-22 RX ORDER — ERGOCALCIFEROL 1.25 MG/1
50000 CAPSULE ORAL
Qty: 12 CAPSULE | Refills: 1 | Status: SHIPPED | OUTPATIENT
Start: 2024-07-22

## 2024-07-22 RX ORDER — ATORVASTATIN CALCIUM 40 MG/1
TABLET, FILM COATED ORAL
Qty: 90 TABLET | Refills: 3 | Status: SHIPPED | OUTPATIENT
Start: 2024-07-22

## 2024-07-22 RX ORDER — TADALAFIL 10 MG/1
10 TABLET ORAL DAILY PRN
Qty: 12 TABLET | Refills: 3 | Status: SHIPPED | OUTPATIENT
Start: 2024-07-22

## 2024-07-22 NOTE — PROGRESS NOTES
Patient ID: Willis Faye is a 74 y.o. male.     Chief Complaint: Annual Exam    HPI   Patient here for annual exam. He is accompanied by his wife. He reports decreased hearing and thinks that his ears need to be cleaned. He also reports problems with getting an erection and keeping an erection. He occasionally has morning erections.  He also reports blurry vision. He has not seen an eye doctor recently.     Review of Systems  Review of Systems   Constitutional:  Negative for fever.   HENT:  Negative for ear pain and sinus pain.    Eyes:  Negative for discharge.   Respiratory:  Negative for cough and shortness of breath.    Cardiovascular:  Negative for chest pain and leg swelling.   Gastrointestinal:  Negative for diarrhea, nausea and vomiting.   Genitourinary:  Negative for urgency.   Musculoskeletal:  Negative for myalgias.   Skin:  Negative for rash.   Neurological:  Negative for weakness and headaches.   Psychiatric/Behavioral:  Negative for depression.    All other systems reviewed and are negative.      Currently Medications  Current Outpatient Medications on File Prior to Visit   Medication Sig Dispense Refill    acetaminophen (TYLENOL ARTHRITIS PAIN ORAL) Take 2 tablets by mouth as needed.      aspirin 325 MG tablet Take 1 tablet (325 mg total) by mouth once daily. 30 tablet 11    [DISCONTINUED] atorvastatin (LIPITOR) 40 MG tablet TAKE 1 TABLET(40 MG) BY MOUTH EVERY DAY 90 tablet 1    [DISCONTINUED] ergocalciferol (ERGOCALCIFEROL) 50,000 unit Cap Take 1 capsule (50,000 Units total) by mouth every 7 days. 12 capsule 1    [DISCONTINUED] clopidogreL (PLAVIX) 75 mg tablet Take 1 tablet (75 mg total) by mouth once daily. (Patient not taking: Reported on 8/12/2021) 30 tablet 11     No current facility-administered medications on file prior to visit.       Physical  Exam  Vitals:    07/22/24 0829   BP: 136/86   BP Location: Left arm   Patient Position: Sitting   Pulse: 99   SpO2: 96%   Weight: 91.6 kg (201 lb 13.3  "oz)   Height: 6' 2" (1.88 m)      Body mass index is 25.91 kg/m².  Wt Readings from Last 3 Encounters:   07/22/24 91.6 kg (201 lb 13.3 oz)   05/25/23 92 kg (202 lb 11.4 oz)   04/20/22 90.9 kg (200 lb 4.6 oz)       Physical Exam  Vitals and nursing note reviewed.   Constitutional:       General: He is not in acute distress.     Appearance: He is not ill-appearing.   HENT:      Head: Normocephalic and atraumatic.      Right Ear: External ear normal.      Left Ear: External ear normal.      Nose: Nose normal.      Mouth/Throat:      Mouth: Mucous membranes are moist.   Eyes:      Extraocular Movements: Extraocular movements intact.      Conjunctiva/sclera: Conjunctivae normal.   Cardiovascular:      Rate and Rhythm: Normal rate and regular rhythm.      Pulses: Normal pulses.      Heart sounds: No murmur heard.  Pulmonary:      Effort: Pulmonary effort is normal. No respiratory distress.      Breath sounds: No wheezing.   Abdominal:      General: There is no distension.      Palpations: Abdomen is soft. There is no mass.      Tenderness: There is no abdominal tenderness.   Musculoskeletal:         General: No swelling.      Cervical back: Normal range of motion.   Skin:     Coloration: Skin is not jaundiced.      Findings: No rash.   Neurological:      General: No focal deficit present.      Mental Status: He is alert and oriented to person, place, and time.   Psychiatric:         Mood and Affect: Mood normal.         Thought Content: Thought content normal.         Labs:    Complete Blood Count  Lab Results   Component Value Date    RBC 6.84 (H) 06/01/2023    HGB 15.7 06/01/2023    HCT 51.5 06/01/2023    MCV 75 (L) 06/01/2023    MCH 23.0 (L) 06/01/2023    MCHC 30.5 (L) 06/01/2023    RDW 16.7 (H) 06/01/2023     06/01/2023    MPV SEE COMMENT 06/01/2023    GRAN 1.2 (L) 06/01/2023    GRAN 32.2 (L) 06/01/2023    LYMPH 1.9 06/01/2023    LYMPH 48.8 (H) 06/01/2023    MONO 0.3 06/01/2023    MONO 8.7 06/01/2023    EOS 0.4 " "06/01/2023    BASO 0.02 06/01/2023    EOSINOPHIL 9.5 (H) 06/01/2023    BASOPHIL 0.5 06/01/2023    DIFFMETHOD Automated 06/01/2023       Comprehensive Metabolic Panel  No results found for: "GLU", "BUN", "CREATININE", "NA", "K", "CL", "PROT", "ALBUMIN", "BILITOT", "AST", "ALKPHOS", "CO2", "ALT", "ANIONGAP", "EGFRNONAA", "ESTGFRAFRICA"    TSH  No results found for: "TSH"    Imaging:  Cardiac event monitor  · Symptoms corresponding with normal sinus rhythm.  · Negative event monitor with no clinical arrhythmias.      2 events were transmitted. 0 symptomatic; 2 device triggered   Patient monitored for 28d 6h 11m   7,156 PACs with PAC burden of <1%   15,442 PVCs with PVC burden of 1%      Assessment/Plan:    1. Hearing loss, unspecified hearing loss type, unspecified laterality  -     Ambulatory referral/consult to ENT; Future; Expected date: 07/29/2024  -     Ambulatory referral/consult to Audiology; Future; Expected date: 07/29/2024  -     TSH; Future; Expected date: 07/22/2024    2. Prostate cancer screening  -     PSA, SCREENING; Future; Expected date: 07/22/2024    3. Screening for cardiovascular condition  -     CBC Auto Differential; Future  -     Comprehensive metabolic panel; Future; Expected date: 07/22/2024  -     LIPID PANEL; Future; Expected date: 07/22/2024    4. Hyperglycemia  -     HEMOGLOBIN A1C; Future; Expected date: 07/22/2024    5. Abnormal finding of blood chemistry, unspecified  -     CBC Auto Differential; Future    6. Other long term (current) drug therapy  -     TSH; Future; Expected date: 07/22/2024    7. Blurry vision, bilateral  -     Ambulatory referral/consult to Ophthalmology; Future; Expected date: 07/29/2024    8. Erectile dysfunction, unspecified erectile dysfunction type  -     tadalafiL (CIALIS) 10 MG tablet; Take 1 tablet (10 mg total) by mouth daily as needed for Erectile Dysfunction.  Dispense: 12 tablet; Refill: 3    9. Vitamin D deficiency  -     ergocalciferol (ERGOCALCIFEROL) " 50,000 unit Cap; Take 1 capsule (50,000 Units total) by mouth every 7 days.  Dispense: 12 capsule; Refill: 1    10. Personal history of nicotine dependence  -     CT Chest Lung Screening Low Dose; Future; Expected date: 07/22/2024    11. Impacted cerumen of both ears  -     Ear Cerumen Removal    Other orders  -     atorvastatin (LIPITOR) 40 MG tablet; TAKE 1 TABLET(40 MG) BY MOUTH EVERY DAY  Dispense: 90 tablet; Refill: 3    Visit today included increased complexity associated with the care of the episodic problem HLD addressed and managing the longitudinal care of the patient due to the serious and/or complex managed problem(s).    I spent a total of 45 minutes on the day of the visit.This includes face to face time and non-face to face time preparing to see the patient (eg, review of tests), obtaining and/or reviewing separately obtained history, documenting clinical information in the electronic or other health record, independently interpreting results and communicating results to the patient/family/caregiver, or care coordinator.      Discussed how to stay healthy including: diet, exercise, refraining from smoking and discussed screening exams / tests needed for age, sex and family Hx.    RTC pending labs    Derrek Vigil MD

## 2024-07-22 NOTE — PROCEDURES
"Ear Cerumen Removal    Date/Time: 7/22/2024 8:20 AM    Performed by: Derrek Vigil MD  Authorized by: Derrek Vigil MD    Time out: Immediately prior to procedure a "time out" was called to verify the correct patient, procedure, equipment, support staff and site/side marked as required.    Consent Done?:  Yes (Verbal)    Local anesthetic:  None  Ceruminolytics applied: Ceruminolytics applied prior to the procedure (liquid docusate)    Location details:  Both ears  Procedure type: irrigation    Cerumen  Removal Results:  Cerumen completely removed  Patient tolerance:  Patient tolerated the procedure well with no immediate complications    "

## 2024-07-23 ENCOUNTER — TELEPHONE (OUTPATIENT)
Dept: FAMILY MEDICINE | Facility: CLINIC | Age: 74
End: 2024-07-23
Payer: MEDICARE

## 2024-07-25 ENCOUNTER — HOSPITAL ENCOUNTER (OUTPATIENT)
Dept: RADIOLOGY | Facility: HOSPITAL | Age: 74
Discharge: HOME | End: 2024-07-25
Attending: STUDENT IN AN ORGANIZED HEALTH CARE EDUCATION/TRAINING PROGRAM
Payer: MEDICARE

## 2024-07-25 DIAGNOSIS — Z87.891 PERSONAL HISTORY OF NICOTINE DEPENDENCE: ICD-10-CM

## 2024-07-25 PROCEDURE — 71271 CT THORAX LUNG CANCER SCR C-: CPT | Mod: TC,PN

## 2024-07-29 DIAGNOSIS — R97.20 ELEVATED PSA: Primary | ICD-10-CM

## 2024-07-31 ENCOUNTER — TELEPHONE (OUTPATIENT)
Dept: FAMILY MEDICINE | Facility: CLINIC | Age: 74
End: 2024-07-31
Payer: MEDICARE

## 2024-07-31 NOTE — TELEPHONE ENCOUNTER
Spoke to pts friend Madeleine, She helps ptLynn Casarez please call and speak with Madeleine to help schedule urology appt

## 2024-07-31 NOTE — TELEPHONE ENCOUNTER
----- Message from Derrek Vigil MD sent at 7/29/2024  5:37 PM CDT -----  PSA remains elevated. I will refer you to a urologist.

## 2024-08-30 ENCOUNTER — TELEPHONE (OUTPATIENT)
Dept: FAMILY MEDICINE | Facility: CLINIC | Age: 74
End: 2024-08-30
Payer: MEDICARE

## 2024-08-30 DIAGNOSIS — E55.9 VITAMIN D DEFICIENCY: ICD-10-CM

## 2024-08-30 RX ORDER — ERGOCALCIFEROL 1.25 MG/1
50000 CAPSULE ORAL
Qty: 12 CAPSULE | Refills: 1 | Status: SHIPPED | OUTPATIENT
Start: 2024-08-30

## 2024-08-30 NOTE — TELEPHONE ENCOUNTER
----- Message from Laura Toribio sent at 8/30/2024  8:38 AM CDT -----  Type:  RX Refill Request    Who Called: pt  Refill or New Rx:refill  RX Name and Strength:Vitamin D2   Preferred Pharmacy with phone number:[id number] - [place] [address] [facility] AT [facility]  Local or Mail Order:local  Ordering Provider:mikey  Would the patient rather a call back or a response via [facility]? call  Best Call Back Number: [phone]  Additional Information:  Need new script

## 2024-09-23 NOTE — PROGRESS NOTES
Subjective:       Patient ID: Willis Faye is a 74 y.o. male.    Chief Complaint: elevated PSA     This is a 74 y.o.  male patient that is new to me.  The patient was referred to me by Dr. Vigil for elevated PSA.  Previously abnormal PSA: yes. Previous biopsy: no. Previous abnormal GUS: no.  Family history of prostate cancer: no.  Blood thinners:  no.  Denies LUTS, dysuria, perineal pain, fevers, chills, gross hematuria. Reports that he is a smoker, smokes 1/3 pack of cigarettes per day. PMH of a stroke 2-3 years ago    LAST PSA  Lab Results   Component Value Date    PSA 6.8 (H) 07/25/2024    PSA 5.8 (H) 06/01/2023    PSA 4.0 04/21/2022    PSA 4.1 (H) 03/05/2021       Lab Results   Component Value Date    CREATININE 1.08 06/01/2023       ---  Past Medical History:   Diagnosis Date    Stroke        Past Surgical History:   Procedure Laterality Date    COLONOSCOPY N/A 2/4/2021    Procedure: COLONOSCOPY Suprep;  Surgeon: Deepti Nickerson MD;  Location: [facility];  Service: Endoscopy;  Laterality: N/A;       Family History   Problem Relation Name Age of Onset    Heart disease Mother      COPD Mother      Alcohol abuse Father         Social History     Tobacco Use    Smoking status: Every Day     Current packs/day: 1.00     Average packs/day: 1 pack/day for 50.7 years (50.7 ttl pk-yrs)     Types: Cigarettes     Start date: 1974     Passive exposure: Current    Smokeless tobacco: Never   Substance Use Topics    Alcohol use: Yes     Comment: occasionally    Drug use: Never       Current Outpatient Medications on File Prior to Visit   Medication Sig Dispense Refill    acetaminophen (TYLENOL ARTHRITIS PAIN ORAL) Take 2 tablets by mouth as needed.      aspirin 325 MG tablet Take 1 tablet (325 mg total) by mouth once daily. 30 tablet 11    atorvastatin (LIPITOR) 40 MG tablet TAKE 1 TABLET(40 MG) BY MOUTH EVERY DAY 90 tablet 3    ergocalciferol (ERGOCALCIFEROL) 50,000 unit Cap Take 1 capsule (50,000 Units total) by  mouth every 7 days. 12 capsule 1    tadalafiL (CIALIS) 10 MG tablet Take 1 tablet (10 mg total) by mouth daily as needed for Erectile Dysfunction. 12 tablet 3     No current facility-administered medications on file prior to visit.       Review of patient's allergies indicates:  No Known Allergies    Review of Systems   Constitutional:  Negative for activity change, chills and fever.   Respiratory:  Negative for shortness of breath.    Cardiovascular:  Negative for chest pain and palpitations.   Gastrointestinal:  Negative for abdominal pain and constipation.   Genitourinary:  Negative for difficulty urinating, dysuria, flank pain, frequency, hematuria and urgency.   Neurological:  Negative for dizziness and light-headedness.     Objective:      Physical Exam  HENT:      Head: Normocephalic.   Pulmonary:      Effort: Pulmonary effort is normal.   Abdominal:      General: Abdomen is flat.      Palpations: Abdomen is soft.   Genitourinary:     Prostate: Enlarged. Not tender and no nodules present.      Comments: GUS performed- no tenderness, enlarged without lesions or nodules palpated.  Musculoskeletal:         General: Normal range of motion.      Cervical back: Normal range of motion.   Skin:     General: Skin is warm and dry.   Neurological:      Mental Status: He is alert and oriented to person, place, and time.       Assessment:     Problem Noted   Elevated Psa 11/27/2023    PSA 6.8           Plan:     Discussed elevated PSA in depth and options such as surveillance, prostate biopsy, MRI of the prostate. Based on patients continuous elevation, MRI of the prostate recommended. Patient and significant other agreed with this plan.  MRI of prostate ordered, will call with results, patient and significant other request to call Madeleine with results.  F/u pending MRI results.        RICARDA Casper     I spent a total of 25 minutes on the day of the visit.This includes face to face time and non-face to face time  preparing to see the patient (eg, review of tests), obtaining and/or reviewing separately obtained history, documenting clinical information in the electronic or other health record, independently interpreting results and communicating results to the patient/family/caregiver, or care coordinator.

## 2024-09-24 ENCOUNTER — LAB VISIT (OUTPATIENT)
Dept: LAB | Facility: HOSPITAL | Age: 74
End: 2024-09-24
Payer: MEDICARE

## 2024-09-24 ENCOUNTER — OFFICE VISIT (OUTPATIENT)
Dept: UROLOGY | Facility: CLINIC | Age: 74
End: 2024-09-24
Payer: MEDICARE

## 2024-09-24 VITALS
HEART RATE: 87 BPM | HEIGHT: 74 IN | WEIGHT: 202.63 LBS | BODY MASS INDEX: 26 KG/M2 | SYSTOLIC BLOOD PRESSURE: 146 MMHG | DIASTOLIC BLOOD PRESSURE: 83 MMHG

## 2024-09-24 DIAGNOSIS — R97.20 ELEVATED PSA: ICD-10-CM

## 2024-09-24 DIAGNOSIS — R97.20 ELEVATED PSA: Primary | ICD-10-CM

## 2024-09-24 LAB
ANION GAP SERPL CALC-SCNC: 11 MMOL/L (ref 8–16)
BUN SERPL-MCNC: 7 MG/DL (ref 8–23)
CALCIUM SERPL-MCNC: 9.8 MG/DL (ref 8.7–10.5)
CHLORIDE SERPL-SCNC: 106 MMOL/L (ref 95–110)
CO2 SERPL-SCNC: 22 MMOL/L (ref 23–29)
CREAT SERPL-MCNC: 1.1 MG/DL (ref 0.5–1.4)
EST. GFR  (NO RACE VARIABLE): >60 ML/MIN/1.73 M^2
GLUCOSE SERPL-MCNC: 93 MG/DL (ref 70–110)
POTASSIUM SERPL-SCNC: 4.2 MMOL/L (ref 3.5–5.1)
SODIUM SERPL-SCNC: 139 MMOL/L (ref 136–145)

## 2024-09-24 PROCEDURE — 99213 OFFICE O/P EST LOW 20 MIN: CPT | Mod: PBBFAC,PO

## 2024-09-24 PROCEDURE — 36415 COLL VENOUS BLD VENIPUNCTURE: CPT

## 2024-09-24 PROCEDURE — 99214 OFFICE O/P EST MOD 30 MIN: CPT | Mod: S$PBB,,,

## 2024-09-24 PROCEDURE — 80048 BASIC METABOLIC PNL TOTAL CA: CPT

## 2024-09-24 PROCEDURE — 99999 PR PBB SHADOW E&M-EST. PATIENT-LVL III: CPT | Mod: PBBFAC,,,

## 2024-10-17 ENCOUNTER — HOSPITAL ENCOUNTER (OUTPATIENT)
Dept: RADIOLOGY | Facility: HOSPITAL | Age: 74
Discharge: HOME | End: 2024-10-17
Payer: MEDICARE

## 2024-10-17 DIAGNOSIS — R97.20 ELEVATED PSA: ICD-10-CM

## 2024-10-17 PROCEDURE — 72197 MRI PELVIS W/O & W/DYE: CPT | Mod: 26,,, | Performed by: INTERNAL MEDICINE

## 2024-10-17 PROCEDURE — 25500020 PHARM REV CODE 255

## 2024-10-17 PROCEDURE — A9585 GADOBUTROL INJECTION: HCPCS

## 2024-10-17 PROCEDURE — 72197 MRI PELVIS W/O & W/DYE: CPT | Mod: TC

## 2024-10-17 RX ORDER — GADOBUTROL 604.72 MG/ML
10 INJECTION INTRAVENOUS
Status: COMPLETED | OUTPATIENT
Start: 2024-10-17 | End: 2024-10-17

## 2024-10-17 RX ADMIN — GADOBUTROL 10 ML: 604.72 INJECTION INTRAVENOUS at 01:10

## 2024-10-22 ENCOUNTER — TELEPHONE (OUTPATIENT)
Dept: UROLOGY | Facility: CLINIC | Age: 74
End: 2024-10-22
Payer: MEDICARE

## 2024-10-22 NOTE — TELEPHONE ENCOUNTER
Attempted to call Mr. Faye to discuss his MRI results and schedule him for a URONAV biopsy. Left a voicemail. Will try and call again later.  -Citlalli Alford NP

## 2024-10-23 ENCOUNTER — TELEPHONE (OUTPATIENT)
Dept: UROLOGY | Facility: CLINIC | Age: 74
End: 2024-10-23
Payer: MEDICARE

## 2024-10-23 NOTE — TELEPHONE ENCOUNTER
Attempted to call Mr. Faye again to discuss MRI results. Patient did not answer, left a voicemail with clinic number to call back.  -Citlalli Alford NP

## 2024-10-31 ENCOUNTER — TELEPHONE (OUTPATIENT)
Dept: UROLOGY | Facility: CLINIC | Age: 74
End: 2024-10-31
Payer: MEDICARE

## 2024-11-08 ENCOUNTER — TELEPHONE (OUTPATIENT)
Dept: UROLOGY | Facility: CLINIC | Age: 74
End: 2024-11-08
Payer: MEDICARE

## 2024-11-08 NOTE — TELEPHONE ENCOUNTER
----- Message from Mercedes sent at 11/8/2024  8:17 AM CST -----  Type:  Test Results    Who Called:  Pt's caregiver   Name of Test (Lab/Mammo/Etc): non fast lab   Date of Test: 09/24  Ordering Provider: Rocío  Where the test was performed: [facility]   Would the patient rather a call back or a response via [facility]? Call   Best Call Back Number: [phone]   Additional Information:

## 2024-11-12 ENCOUNTER — TELEPHONE (OUTPATIENT)
Dept: UROLOGY | Facility: CLINIC | Age: 74
End: 2024-11-12
Payer: MEDICARE

## 2024-11-12 NOTE — TELEPHONE ENCOUNTER
Spoke with patient wife and inform her the NP call a couple of times and I need to schedule an audio call. Patient spouse voice her understanding.

## 2024-11-12 NOTE — TELEPHONE ENCOUNTER
----- Message from Gus sent at 11/12/2024  2:20 PM CST -----  .Type:  Needs Medical Advice    Who Called: pt wife    Would the patient rather a call back or a response via [facility]? Call back  Best Call Back Number: [phone]  Additional Information:     Pt wife stated she missed a call and would like a call back

## 2024-11-14 ENCOUNTER — OFFICE VISIT (OUTPATIENT)
Dept: UROLOGY | Facility: CLINIC | Age: 74
End: 2024-11-14
Payer: MEDICARE

## 2024-11-14 DIAGNOSIS — R93.89 ABNORMAL MRI: ICD-10-CM

## 2024-11-14 DIAGNOSIS — R97.20 ELEVATED PSA: Primary | ICD-10-CM

## 2024-11-14 PROCEDURE — 99211 OFF/OP EST MAY X REQ PHY/QHP: CPT | Mod: PBBFAC,PO

## 2024-11-14 PROCEDURE — 99212 OFFICE O/P EST SF 10 MIN: CPT | Mod: S$PBB,,,

## 2024-11-14 PROCEDURE — 99999 PR PBB SHADOW E&M-EST. PATIENT-LVL I: CPT | Mod: PBBFAC,,,

## 2024-11-14 RX ORDER — CEFTRIAXONE 1 G/1
1 INJECTION, POWDER, FOR SOLUTION INTRAMUSCULAR; INTRAVENOUS
Status: SHIPPED | OUTPATIENT
Start: 2024-11-14 | End: 2024-11-15

## 2024-11-14 RX ORDER — CIPROFLOXACIN 500 MG/1
500 TABLET ORAL ONCE
Qty: 1 TABLET | Refills: 0 | Status: SHIPPED | OUTPATIENT
Start: 2024-11-14 | End: 2024-11-14

## 2024-11-14 NOTE — PROGRESS NOTES
The patient location is: Home  The chief complaint leading to consultation is: MRI f/u    Visit type: audio only      20 minutes of total time spent on the encounter, which includes face to face time and non-face to face time preparing to see the patient (eg, review of tests), Obtaining and/or reviewing separately obtained history, Documenting clinical information in the electronic or other health record, Independently interpreting results (not separately reported) and communicating results to the patient/family/caregiver, or Care coordination (not separately reported).         Each patient to whom he or she provides medical services by telemedicine is:  (1) informed of the relationship between the physician and patient and the respective role of any other health care provider with respect to management of the patient; and (2) notified that he or she may decline to receive medical services by telemedicine and may withdraw from such care at any time.    Notes:      Subjective:       Patient ID: Willis Faye is a 74 y.o. male.    Chief Complaint: elevated PSA     This is a 74 y.o.  male patient that is new to me.  The patient was referred to me by Dr. Vigil for elevated PSA.  Previously abnormal PSA: yes. Previous biopsy: no. Previous abnormal GUS: no.  Family history of prostate cancer: no.  Blood thinners:  no.  Denies LUTS, dysuria, perineal pain, fevers, chills, gross hematuria. Reports that he is a smoker, smokes 1/3 pack of cigarettes per day. PMH of a stroke 2-3 years ago    11/14/24-- F/u for MRI results.   MRI 10/17/24-- showed Overall Assessment: PI-RADS 3 - Intermediate (the presence of clinically significant cancer is equivocal).   Number of targets created for potential MR/US fusion biopsy:   Peripheral zone: 1   Transition zone: 1      LAST PSA  Lab Results   Component Value Date    PSA 6.8 (H) 07/25/2024    PSA 5.8 (H) 06/01/2023    PSA 4.0 04/21/2022    PSA 4.1 (H) 03/05/2021       Lab Results    Component Value Date    CREATININE 1.1 09/24/2024       ---  Past Medical History:   Diagnosis Date    Stroke        Past Surgical History:   Procedure Laterality Date    COLONOSCOPY N/A 2/4/2021    Procedure: COLONOSCOPY Suprep;  Surgeon: Deepti Nickerson MD;  Location: [facility];  Service: Endoscopy;  Laterality: N/A;       Family History   Problem Relation Name Age of Onset    Heart disease Mother      COPD Mother      Alcohol abuse Father         Social History     Tobacco Use    Smoking status: Every Day     Current packs/day: 1.00     Average packs/day: 1 pack/day for 50.9 years (50.9 ttl pk-yrs)     Types: Cigarettes     Start date: 1974     Passive exposure: Current    Smokeless tobacco: Never   Substance Use Topics    Alcohol use: Yes     Comment: occasionally    Drug use: Never       Current Outpatient Medications on File Prior to Visit   Medication Sig Dispense Refill    acetaminophen (TYLENOL ARTHRITIS PAIN ORAL) Take 2 tablets by mouth as needed.      aspirin 325 MG tablet Take 1 tablet (325 mg total) by mouth once daily. 30 tablet 11    atorvastatin (LIPITOR) 40 MG tablet TAKE 1 TABLET(40 MG) BY MOUTH EVERY DAY 90 tablet 3    ergocalciferol (ERGOCALCIFEROL) 50,000 unit Cap Take 1 capsule (50,000 Units total) by mouth every 7 days. 12 capsule 1    tadalafiL (CIALIS) 10 MG tablet Take 1 tablet (10 mg total) by mouth daily as needed for Erectile Dysfunction. 12 tablet 3     No current facility-administered medications on file prior to visit.       Review of patient's allergies indicates:  No Known Allergies    Review of Systems   Constitutional:  Negative for activity change, chills and fever.   Respiratory:  Negative for shortness of breath.    Cardiovascular:  Negative for chest pain and palpitations.   Gastrointestinal:  Negative for abdominal pain and constipation.   Genitourinary:  Negative for difficulty urinating, dysuria, flank pain, frequency, hematuria and urgency.   Neurological:  Negative  for dizziness and light-headedness.     Objective:      Physical Exam  HENT:      Head: Normocephalic.   Pulmonary:      Effort: Pulmonary effort is normal.   Abdominal:      General: Abdomen is flat.      Palpations: Abdomen is soft.   Genitourinary:     Prostate: Enlarged. Not tender and no nodules present.      Comments: GUS performed- no tenderness, enlarged without lesions or nodules palpated.  Musculoskeletal:         General: Normal range of motion.      Cervical back: Normal range of motion.   Skin:     General: Skin is warm and dry.   Neurological:      Mental Status: He is alert and oriented to person, place, and time.       Assessment:     Problem Noted   Elevated Psa 11/27/2023    PSA 6.8           Plan:     Reviewed MRI results, explained to the patient that a prostate biopsy is warranted based on MRI results. Patient and wife voiced understanding.  Answered all questions regarding MRI and biopsy results.  Uronav biopsy ordered to be scheduled with Dr. Penaloza.  Follow-up pending biopsy results.        RICARDA Casper

## 2024-11-14 NOTE — Clinical Note
Can we schedule him for the uronav biopsy with Dr. Penaloza. I am not sure if he needs anything else ordered with it. Thanks, Citlalli

## 2024-11-15 ENCOUNTER — TELEPHONE (OUTPATIENT)
Dept: UROLOGY | Facility: CLINIC | Age: 74
End: 2024-11-15
Payer: MEDICARE

## 2024-11-15 NOTE — TELEPHONE ENCOUNTER
I spoke with pt's friend Madeleine, we scheduled Uronav prostate biopsy with Dr. Penaloza at [facility]. I reveiwed all instructions over the phone and also sent MyChart which I printed and mailed to her home. I did instruct her to have patient hold ASA for five days prior, last dose 11/20/24. Also reviewed Cipro x1 dose and enema. Orders entered per Dr. Penaloza.

## 2024-11-25 ENCOUNTER — TELEPHONE (OUTPATIENT)
Dept: UROLOGY | Facility: CLINIC | Age: 74
End: 2024-11-25
Payer: MEDICARE

## 2024-11-25 NOTE — TELEPHONE ENCOUNTER
LMOR for pt re: confirming procedure appt for tomorrow..   LM with location & arrival time for 8:45.

## 2024-11-26 ENCOUNTER — PROCEDURE VISIT (OUTPATIENT)
Dept: UROLOGY | Facility: CLINIC | Age: 74
End: 2024-11-26
Payer: MEDICARE

## 2024-11-26 VITALS
HEIGHT: 74 IN | DIASTOLIC BLOOD PRESSURE: 79 MMHG | WEIGHT: 196.44 LBS | RESPIRATION RATE: 18 BRPM | SYSTOLIC BLOOD PRESSURE: 160 MMHG | BODY MASS INDEX: 25.21 KG/M2 | HEART RATE: 84 BPM | TEMPERATURE: 98 F

## 2024-11-26 DIAGNOSIS — R97.20 ELEVATED PSA: Primary | ICD-10-CM

## 2024-11-26 PROCEDURE — 88305 TISSUE EXAM BY PATHOLOGIST: CPT | Mod: 59 | Performed by: PATHOLOGY

## 2024-11-26 PROCEDURE — 76942 ECHO GUIDE FOR BIOPSY: CPT | Mod: PBBFAC | Performed by: UROLOGY

## 2024-11-26 PROCEDURE — 96372 THER/PROPH/DIAG INJ SC/IM: CPT | Mod: PBBFAC

## 2024-11-26 PROCEDURE — 99999PBSHW PR PBB SHADOW TECHNICAL ONLY FILED TO HB: Mod: PBBFAC,,,

## 2024-11-26 RX ORDER — LIDOCAINE HYDROCHLORIDE 10 MG/ML
20 INJECTION, SOLUTION INFILTRATION; PERINEURAL
Status: COMPLETED | OUTPATIENT
Start: 2024-11-26 | End: 2024-11-26

## 2024-11-26 RX ORDER — CEFTRIAXONE 1 G/1
1 INJECTION, POWDER, FOR SOLUTION INTRAMUSCULAR; INTRAVENOUS
Status: COMPLETED | OUTPATIENT
Start: 2024-11-26 | End: 2024-11-26

## 2024-11-26 RX ORDER — LIDOCAINE HYDROCHLORIDE 20 MG/ML
JELLY TOPICAL ONCE
Status: COMPLETED | OUTPATIENT
Start: 2024-11-26 | End: 2024-11-26

## 2024-11-26 RX ADMIN — LIDOCAINE HYDROCHLORIDE 10 ML: 20 JELLY TOPICAL at 09:11

## 2024-11-26 RX ADMIN — LIDOCAINE HYDROCHLORIDE 20 ML: 10 INJECTION, SOLUTION INFILTRATION; PERINEURAL at 09:11

## 2024-11-26 RX ADMIN — CEFTRIAXONE SODIUM 1 G: 1 INJECTION, POWDER, FOR SOLUTION INTRAMUSCULAR; INTRAVENOUS at 09:11

## 2024-11-26 NOTE — PROCEDURES
"TRUS    Date/Time: 11/26/2024 9:35 AM    Performed by: Stewart Penaloza MD  Authorized by: Stewart Penaloza MD    Consent Done?:  Yes (Written)  Time out: Immediately prior to procedure a "time out" was called to verify the correct patient, procedure, equipment, support staff and site/side marked as required.    Indications: Elevated PSA    Preparation: Patient was prepped and draped in usual sterile fashion    Position:  Left lateral  Anesthesia:  Pudendal nerve block, 20cc's 1% Lidocaine and Lidocaine jelly  Prostate Size:  59 ccs  Left Base Biopsies: 4  Left Mid Biopsies: 4  Left [place] Biopsies: 2  Right Base Biopsies: 2  Right Mid Biopsies: 2  Right [place] Biopsies: 2  Total Biopsies:  16    Patient tolerance:  Patient tolerated the procedure well with no immediate complications      The risks and benefits of the procedure were explained to the patient and consent was obtained.  The patient laid in the lateral decubitus position.    The ultrasound probe was advanced into the rectum. The prostate was visualized.    20cc of 1% lidocaine without epi was used for a prostatic nerve block.    At this point, a sweep of the prostate was performed from base to apex in the transverse plane using the ultrasound and URONAV platform.  Landmarks were then labeled with anterior, posterior, right, left, base and apex were labeled in the axial as well as sagittal planes.  Segmentation was then performed and manual adjustments were made as needed starting in the sagittal plane followed by the axial plane.  At this point, alignment of the ultrasound with MRI images was ensured using the toggle between ultrasound and MRI.     At this point elastic deformation was computed.  Our images were satisfactory.    Target 1-left middle-4 targeted cores  Target 2-left base-4 targeted cores.  Excellent co-registration.  "

## 2024-11-26 NOTE — PATIENT INSTRUCTIONS
What to Expect After a Prostate Biopsy    You may have mild bleeding from the rectum or urine for about 1 week to 1 month, or in your ejaculate for several months. This bleeding is normal and expected, and it will stop. You may have mild discomfort in your rectal or urethral area for 24-48 hours.    You cannot do any strenuous lifting, straining, or exercising for 24 hours. You may return to full activity the day after the biopsy.    You may continue to take all your regular medications after the procedure except for the blood thinners.    You may resume all blood-thinning medications once you no longer see any bleeding or whenever your physician prescribing the medication says it is all right to do so. You may take Tylenol if you have a fever and your temperature is less than 100° F or if you have some discomfort.    You will receive a call from the Urology Department at [facility] with the results of your prostate biopsy within one week.    Signs and Symptoms to Report    Call your [facility] urologist at [phone] if you develop any of the following:  Temperature greater than 101°  F  Inability to urinate  A large amount of bleeding from the rectum or in the urine  Persistent or severe pain    After hours or on weekends, you may reach a urology resident on call at this number: [phone].

## 2024-11-29 LAB
FINAL PATHOLOGIC DIAGNOSIS: NORMAL
GROSS: NORMAL
Lab: NORMAL

## 2024-12-03 ENCOUNTER — TELEPHONE (OUTPATIENT)
Dept: UROLOGY | Facility: CLINIC | Age: 74
End: 2024-12-03
Payer: MEDICARE

## 2024-12-04 ENCOUNTER — TELEPHONE (OUTPATIENT)
Dept: UROLOGY | Facility: CLINIC | Age: 74
End: 2024-12-04
Payer: MEDICARE

## 2024-12-23 ENCOUNTER — OFFICE VISIT (OUTPATIENT)
Dept: UROLOGY | Facility: CLINIC | Age: 74
End: 2024-12-23
Payer: MEDICARE

## 2024-12-23 VITALS
DIASTOLIC BLOOD PRESSURE: 77 MMHG | SYSTOLIC BLOOD PRESSURE: 156 MMHG | WEIGHT: 199.19 LBS | HEIGHT: 74 IN | BODY MASS INDEX: 25.56 KG/M2 | HEART RATE: 93 BPM

## 2024-12-23 DIAGNOSIS — N52.8 OTHER MALE ERECTILE DYSFUNCTION: Primary | ICD-10-CM

## 2024-12-23 DIAGNOSIS — C61 PROSTATE CANCER: ICD-10-CM

## 2024-12-23 DIAGNOSIS — R97.20 ELEVATED PSA: ICD-10-CM

## 2024-12-23 PROCEDURE — 99213 OFFICE O/P EST LOW 20 MIN: CPT | Mod: PBBFAC,PO | Performed by: UROLOGY

## 2024-12-23 PROCEDURE — 99215 OFFICE O/P EST HI 40 MIN: CPT | Mod: S$PBB,,, | Performed by: UROLOGY

## 2024-12-23 PROCEDURE — 99999 PR PBB SHADOW E&M-EST. PATIENT-LVL III: CPT | Mod: PBBFAC,,, | Performed by: UROLOGY

## 2024-12-23 RX ORDER — TADALAFIL 10 MG/1
10 TABLET ORAL DAILY PRN
Qty: 30 TABLET | Refills: 11 | Status: SHIPPED | OUTPATIENT
Start: 2024-12-23 | End: 2025-12-23

## 2024-12-23 NOTE — PROGRESS NOTES
[place] - Urology   Clinic Note    SUBJECTIVE:     Chief Complaint   Patient presents with    Results       Referral from: No ref. provider found.    History of Present Illness:  Willis Faye is a 74 y.o. male who presents to clinic for evaluation of recently diagnosed prostate cancer.    The patient was referred to me by Dr. Vigil for elevated PSA.  Previously abnormal PSA: yes. Previous biopsy: no. Previous abnormal GUS: no.  Family history of prostate cancer: no.  Blood thinners:  no.  Denies LUTS, dysuria, perineal pain, fevers, chills, gross hematuria. Reports that he is a smoker, smokes 1/3 pack of cigarettes per day. PMH of a stroke 2-3 years ago     11/14/24-- F/u for MRI results.   MRI 10/17/24-- showed Overall Assessment: PI-RADS 3 - Intermediate (the presence of clinically significant cancer is equivocal).   Number of targets created for potential MR/US fusion biopsy:   Peripheral zone: 1   Transition zone: 1    Patient endorses no additional complaints at this time.    Was found to have jassi 3+3=6 prostate cancer on recently biopsy (11/26/24) by Dr. Penaloza, low risk per NCCN.     Past Medical History:   Diagnosis Date    Stroke        Past Surgical History:   Procedure Laterality Date    COLONOSCOPY N/A 02/04/2021    Procedure: COLONOSCOPY Suprep;  Surgeon: Deepti Nickerson MD;  Location: [facility];  Service: Endoscopy;  Laterality: N/A;       Family History   Problem Relation Name Age of Onset    Heart disease Mother      COPD Mother      Alcohol abuse Father         Social History     Tobacco Use    Smoking status: Every Day     Current packs/day: 1.00     Average packs/day: 1 pack/day for 51.0 years (51.0 ttl pk-yrs)     Types: Cigarettes     Start date: 1974     Passive exposure: Current    Smokeless tobacco: Never   Substance Use Topics    Alcohol use: Yes     Comment: occasionally    Drug use: Never       Current Outpatient Medications on File Prior to Visit   Medication Sig Dispense Refill     "acetaminophen (TYLENOL ARTHRITIS PAIN ORAL) Take 2 tablets by mouth as needed.      aspirin 325 MG tablet Take 1 tablet (325 mg total) by mouth once daily. 30 tablet 11    atorvastatin (LIPITOR) 40 MG tablet TAKE 1 TABLET(40 MG) BY MOUTH EVERY DAY 90 tablet 3    ergocalciferol (ERGOCALCIFEROL) 50,000 unit Cap Take 1 capsule (50,000 Units total) by mouth every 7 days. 12 capsule 1    [DISCONTINUED] tadalafiL (CIALIS) 10 MG tablet Take 1 tablet (10 mg total) by mouth daily as needed for Erectile Dysfunction. 12 tablet 3     No current facility-administered medications on file prior to visit.       Review of patient's allergies indicates:  No Known Allergies    Review of Systems:  A review of 10+ systems was conducted with pertinent positive and negative findings documented in HPI with all other systems reviewed and negative.    OBJECTIVE:     Estimated body mass index is 25.57 kg/m² as calculated from the following:    Height as of this encounter: 6' 2" (1.88 m).    Weight as of this encounter: 90.4 kg (199 lb 3 oz).    Vital Signs (Most Recent)  Vitals:    12/23/24 0817   BP: (!) 156/77   Pulse: 93       Physical Exam:  GENERAL: patient sitting comfortably  HEENT: normocephalic  NECK: supple, no JVD  PULM: normal chest rise, no increased WOB  HEART: non-diaphoretic  ABDO: soft, nondistended, nontender  BACK: no CVA tenderness bilaterally  SKIN: warm, dry, well perfused  EXT: no bruising or edema  NEURO: grossly normal with no focal deficits  PSYCH: appropriate mood and affect    Genitourinary Exam:  deferred    Lab Results   Component Value Date    BUN 7 (L) 09/24/2024    CREATININE 1.1 09/24/2024    WBC 3.79 (L) 06/01/2023    HGB 15.7 06/01/2023    HCT 51.5 06/01/2023     06/01/2023    AST 25 06/01/2023    ALT 19 06/01/2023    ALKPHOS 66 06/01/2023    ALBUMIN 4.3 06/01/2023    HGBA1C 5.8 (H) 06/01/2023    INR 1.0 12/03/2020        Imaging:  I have personally reviewed the below imaging and discussed my " findings with the patient.     No results found for this or any previous visit (from the past 2160 hours).  Results for orders placed or performed during the hospital encounter of 10/17/24 (from the past 2160 hours)   MRI Prostate W W/O Contrast    Impression    Two PIRADS 3 lesions on the left as described.    Overall Assessment: PI-RADS 3 - Intermediate (the presence of clinically significant cancer is equivocal).    Number of targets created for potential MR/US fusion biopsy:    Peripheral zone: 1    Transition zone: 1    Electronically signed by resident: Tika Figueroa  Date:    10/17/2024  Time:    16:05    Electronically signed by: Graeme Dahl  Date:    10/17/2024  Time:    17:16     MRI Prostate W W/O Contrast  Narrative: EXAMINATION:  MRI PROSTATE W W/O CONTRAST    CLINICAL HISTORY:  Prostate cancer suspected;elevated PSA;  Elevated prostate specific antigen (PSA)    TECHNIQUE:  MRI prostate in accordance with PI-RADS recommendations before and after intravenous administration of 10 mL Gadavist.    COMPARISON:  None.    FINDINGS:  Previous biopsy: None.    PSA: 6.8 ng/mL on 07/25/2024.    Prior therapy: None.    Prostate: 5.8 x 4.5 x 4.6 cm corresponding to a computed volume of 59 cc.    Peripheral zone: Band like T2 hypointensity in the right peripheral zone.  There is a focal lesion in the left peripheral zone at the midgland posteromedially.    Lesion #P-1    Location: Left, posterior medial, mid gland    Size: 1.7 cm    T2W: Heterogeneous signal intensity or non-circumscribed, rounded, moderate hypointensity. Includes others that do not qualify as 2, 4, or 5. Score 3.    DWI: Focal (discrete and different from the background) hypointense on ADC and/or focal hyperintense on high b-value DWI; may be markedly hypointense on ADC or markedly hyperintense on high b-value DWI, but not both. Score 3.    DCE: Positive.    Prostate margin: Tumor-capsule interface >1.0 cm, but no breach of the  capsule.    PI-RADS category: 4    Transitional zone: Lesion in the left anterior transition zone at the mid gland/base    Lesion #T-1    Location: Left, anterior, mid gland/base    Size: 1.2 cm    T2W: Heterogeneous signal intensity with obscured margins. Includes others that do not qualify as 2, 4, or 5. Score 3.    DWI: Focal (discrete and different from the background) hypointense on ADC and/or focal hyperintense on high b-value DWI; may be markedly hypointense on ADC or markedly hyperintense on high b-value DWI, but not both. Score 3.    DCE: Positive.    Prostate margin: No involvement.    PI-RADS category: 3    Neurovascular bundle: Normal appearance.    Seminal vesicles: Normal appearance.    Other pelvic organs: Bladder and rectum are unremarkable.    Lymph nodes: No lymphadenopathy.    Soft tissues/bones: No marrow replacing lesions.  Impression: Two PIRADS 3 lesions on the left as described.    Overall Assessment: PI-RADS 3 - Intermediate (the presence of clinically significant cancer is equivocal).    Number of targets created for potential MR/US fusion biopsy:    Peripheral zone: 1    Transition zone: 1    Electronically signed by resident: Tika Figueroa  Date:    10/17/2024  Time:    16:05    Electronically signed by: Graeme Dahl  Date:    10/17/2024  Time:    17:16       PSA:  Lab Results   Component Value Date    PSA 6.8 (H) 07/25/2024    PSA 5.8 (H) 06/01/2023    PSA 4.0 04/21/2022    PSA 4.1 (H) 03/05/2021       ASSESSMENT     1. Other male erectile dysfunction    2. Elevated PSA    3. Prostate cancer        PLAN:   We discussed his prostate cancer, the Checo grading, cancer staging and the NCCN risk categories. He has low risk disease. We discussed options for this risk category including active surveillance versus treatment including prostatectomy or radiation therapy. We discussed treatment with HIFU as well but guidelines for its usage and follow up are still in early stages.    He has  elected to pursue active surveillance. Per the NCCN, the protocol consists of a confirmatory biopsy with 1-2 years of the initial biopsy. An MRI may be ordered to evaluate for higher stage disease, and may be used to guide future biopsies. PSA tests will be drawn at 6 months or greater intervals and GUS at 12 months or greater intervals.    Plan for PSA in 6 months.   MRI in 12 months, followed by repeat prostate needle biopsy.  Follow up in 6 months.   Tadalafil prescribed for ED    Andrew Santos MD  Urology  [facility] [place]

## 2025-01-06 RX ORDER — ATORVASTATIN CALCIUM 40 MG/1
TABLET, FILM COATED ORAL
Qty: 90 TABLET | Refills: 3 | Status: SHIPPED | OUTPATIENT
Start: 2025-01-06 | End: 2025-01-07 | Stop reason: SDUPTHER

## 2025-01-06 NOTE — TELEPHONE ENCOUNTER
----- Message from Corey sent at 1/6/2025 10:24 AM CST -----  Type: Requesting refill         Who Called: PT's care taker.  Regarding: atorvastatin (LIPITOR) 40 MG tablet  Would the patient rather a call back or a response via [facility]? Call back  Best Call Back Number: [phone]  Additional Information:     [id number] - [address] [facility] [address]   Phone: [phone]  Fax: [phone]

## 2025-01-06 NOTE — TELEPHONE ENCOUNTER
Care Due:                  Date            Visit Type   Department     Provider  --------------------------------------------------------------------------------                                EP -                              PRIMARY      [facility] FAMILY  Last Visit: 07-      CARE ([facility])   MEDICINE       Derrek Vigil  Next Visit: None Scheduled  None         None Found                                                            Last  Test          Frequency    Reason                     Performed    Due Date  --------------------------------------------------------------------------------    CMP.........  12 months..  atorvastatin.............  06- 05-    Lipid Panel.  12 months..  atorvastatin.............  06- 05-    Vitamin D...  12 months..  ergocalciferol...........  Not Found    Overdue    [employer] Embedded Care Due Messages. Reference number: [id number].   1/06/2025 10:50:21 AM CST

## 2025-01-07 RX ORDER — ATORVASTATIN CALCIUM 40 MG/1
TABLET, FILM COATED ORAL
Qty: 90 TABLET | Refills: 3 | Status: SHIPPED | OUTPATIENT
Start: 2025-01-07

## 2025-01-07 NOTE — TELEPHONE ENCOUNTER
No care due was identified.  [facility] Embedded Care Due Messages. Reference number: [id number].   1/07/2025 2:37:31 PM CST

## 2025-01-27 ENCOUNTER — TELEPHONE (OUTPATIENT)
Dept: ADMINISTRATIVE | Facility: CLINIC | Age: 75
End: 2025-01-27
Payer: MEDICARE

## 2025-01-27 NOTE — TELEPHONE ENCOUNTER
Called pt; no answer; could not confirm appt or leave message due to line kept ringing; I was calling to confirm pt's in office EAWV appt on 1/28/25.

## 2025-02-24 DIAGNOSIS — Z00.00 ENCOUNTER FOR MEDICARE ANNUAL WELLNESS EXAM: ICD-10-CM

## 2025-03-03 DIAGNOSIS — E55.9 VITAMIN D DEFICIENCY: ICD-10-CM

## 2025-03-03 RX ORDER — ERGOCALCIFEROL 1.25 MG/1
50000 CAPSULE ORAL
Qty: 12 CAPSULE | Refills: 1 | Status: SHIPPED | OUTPATIENT
Start: 2025-03-03

## 2025-03-03 NOTE — TELEPHONE ENCOUNTER
No care due was identified.  [facility] Embedded Care Due Messages. Reference number: [id number].   3/03/2025 8:41:31 AM CST

## 2025-03-03 NOTE — TELEPHONE ENCOUNTER
----- Message from Xochitl sent at 3/3/2025  8:38 AM CST -----  Type:  RX Refill RequestWho Called: ptRefill or New Rx:refillRX Name and Strength:ergocalciferol (ERGOCALCIFEROL) 50,000 unit CapHow is the patient currently taking it? (ex. 1XDay):Take 1 capsule (50,000 Units total) by mouth every 7 days. - OralIs this a 30 day or 90 day RX:12Preferred Pharmacy with phone number:[id number] - [place], [place] - [address] [facility] AT [facility] & [id number] [facility] [address]Zcjzb: [phone] Fax: [phone]: Not open 24 hoursLocal or Mail Order:localOrdering Provider:dr Hartmanould the patient rather a call back or a response via [facility]? Call Best Call Back Number:[phone] Information:

## 2025-04-07 RX ORDER — ATORVASTATIN CALCIUM 40 MG/1
TABLET, FILM COATED ORAL
Qty: 90 TABLET | Refills: 3 | Status: SHIPPED | OUTPATIENT
Start: 2025-04-07

## 2025-04-07 NOTE — TELEPHONE ENCOUNTER
----- Message from Kathleen sent at 4/7/2025  9:13 AM CDT -----  Regarding: Call back  Contact: [phone]  Type:  RX Refill RequestWho Called: PT Refill or New Rx: Refills RX Name and Strength: atorvastatin (LIPITOR) 40 MG tablet 90 tabletHow is the patient currently taking it? (ex. 1XDay): 1 x a day Is this a 30 day or 90 day RX: 90 Preferred Pharmacy with phone number: [facility] #[id number] - [address] [facility]  [address] Phone: [phone]: [phone]

## 2025-04-07 NOTE — TELEPHONE ENCOUNTER
Care Due:                  Date            Visit Type   Department     Provider  --------------------------------------------------------------------------------                                EP -                              PRIMARY      [facility] FAMILY  Last Visit: 07-      CARE ([facility])   MEDICINE       Derrek Vigil                              EP -                              PRIMARY      [facility] FAMILY  Next Visit: 05-      CARE ([facility])   MEDICINE       Derrek Vigil                                                            Last  Test          Frequency    Reason                     Performed    Due Date  --------------------------------------------------------------------------------    CMP.........  12 months..  atorvastatin.............  06- 05-    Lipid Panel.  12 months..  atorvastatin.............  06- 05-    Vitamin D...  12 months..  ergocalciferol...........  Not Found    Overdue    [employer] Embedded Care Due Messages. Reference number: [id number].   4/07/2025 9:25:06 AM CDT

## 2025-05-26 ENCOUNTER — OFFICE VISIT (OUTPATIENT)
Dept: FAMILY MEDICINE | Facility: CLINIC | Age: 75
End: 2025-05-26
Payer: MEDICARE

## 2025-05-26 VITALS
OXYGEN SATURATION: 93 % | SYSTOLIC BLOOD PRESSURE: 124 MMHG | WEIGHT: 207 LBS | HEART RATE: 105 BPM | TEMPERATURE: 98 F | BODY MASS INDEX: 26.56 KG/M2 | DIASTOLIC BLOOD PRESSURE: 66 MMHG | HEIGHT: 74 IN

## 2025-05-26 DIAGNOSIS — R73.9 HYPERGLYCEMIA: ICD-10-CM

## 2025-05-26 DIAGNOSIS — E78.2 MIXED HYPERLIPIDEMIA: ICD-10-CM

## 2025-05-26 DIAGNOSIS — R93.89 ABNORMAL FINDINGS ON DIAGNOSTIC IMAGING OF OTHER SPECIFIED BODY STRUCTURES: ICD-10-CM

## 2025-05-26 DIAGNOSIS — M79.674 GREAT TOE PAIN, RIGHT: ICD-10-CM

## 2025-05-26 DIAGNOSIS — Z13.6 SCREENING FOR CARDIOVASCULAR CONDITION: ICD-10-CM

## 2025-05-26 DIAGNOSIS — Z00.00 WELLNESS EXAMINATION: Primary | ICD-10-CM

## 2025-05-26 DIAGNOSIS — C61 PRIMARY PROSTATE ADENOCARCINOMA: ICD-10-CM

## 2025-05-26 DIAGNOSIS — E55.9 VITAMIN D DEFICIENCY: ICD-10-CM

## 2025-05-26 PROBLEM — I73.89 OTHER SPECIFIED PERIPHERAL VASCULAR DISEASES: Status: RESOLVED | Noted: 2024-07-22 | Resolved: 2025-05-26

## 2025-05-26 RX ORDER — ERGOCALCIFEROL 1.25 MG/1
50000 CAPSULE ORAL
Qty: 12 CAPSULE | Refills: 4 | Status: SHIPPED | OUTPATIENT
Start: 2025-05-26

## 2025-05-26 RX ORDER — ATORVASTATIN CALCIUM 40 MG/1
TABLET, FILM COATED ORAL
Qty: 90 TABLET | Refills: 3 | Status: SHIPPED | OUTPATIENT
Start: 2025-05-26

## 2025-05-26 NOTE — PROGRESS NOTES
" Patient ID: Willis Faye is a 75 y.o. male.     Chief Complaint: f/u chronic medical conditions    Patient here accompanied by wife    Follow-up  Pertinent negatives include no chest pain, coughing, fever, headaches, myalgias, nausea, rash, vomiting or weakness.     History of Present Illness    Willis presents today for follow up of swollen right great toe.    He reports bumping his toe approximately one week ago, initially not noticing the injury. The swelling has decreased since onset, and he is now able to move the affected toe. While he experienced significant pain several days ago, this has improved and he can now tolerate pressure from shoes on the affected area.    He has stable prostate cancer that does not require treatment at this time. He denies need for chemotherapy or other interventions. He is due for a 6-month follow-up with urology in [place]    He requires refills of vitamin D    He denies chest pain and shortness of breath. He reports normal bowel movements.         Review of Systems  Review of Systems   Constitutional:  Negative for fever.   HENT:  Negative for ear pain and sinus pain.    Eyes:  Negative for discharge.   Respiratory:  Negative for cough and shortness of breath.    Cardiovascular:  Negative for chest pain and leg swelling.   Gastrointestinal:  Negative for diarrhea, nausea and vomiting.   Genitourinary:  Negative for urgency.   Musculoskeletal:  Negative for myalgias.   Skin:  Negative for rash.   Neurological:  Negative for weakness and headaches.   Psychiatric/Behavioral:  Negative for depression.    All other systems reviewed and are negative.      Currently Medications  Medications Ordered Prior to Encounter[1]    Physical  Exam  Vitals:    05/26/25 0847   BP: 124/66   BP Location: Right arm   Patient Position: Sitting   Pulse: 105   Temp: 98 °F (36.7 °C)   SpO2: (!) 93%   Weight: 93.9 kg (207 lb 0.2 oz)   Height: 6' 2" (1.88 m)      Body mass index is 26.58 kg/m².  Wt Readings " from Last 3 Encounters:   05/26/25 93.9 kg (207 lb 0.2 oz)   12/23/24 90.4 kg (199 lb 3 oz)   11/26/24 89.1 kg (196 lb 6.9 oz)       Physical Exam  Vitals and nursing note reviewed.   Constitutional:       General: He is not in acute distress.     Appearance: He is not ill-appearing.   HENT:      Head: Normocephalic and atraumatic.      Right Ear: External ear normal.      Left Ear: External ear normal.      Nose: Nose normal.      Mouth/Throat:      Mouth: Mucous membranes are moist.   Eyes:      Extraocular Movements: Extraocular movements intact.      Conjunctiva/sclera: Conjunctivae normal.   Cardiovascular:      Rate and Rhythm: Normal rate and regular rhythm.      Pulses: Normal pulses.      Heart sounds: No murmur heard.  Pulmonary:      Effort: Pulmonary effort is normal. No respiratory distress.      Breath sounds: No wheezing.   Abdominal:      General: There is no distension.      Palpations: Abdomen is soft. There is no mass.      Tenderness: There is no abdominal tenderness.   Musculoskeletal:         General: No swelling.      Cervical back: Normal range of motion.   Skin:     Coloration: Skin is not jaundiced.      Findings: No rash.   Neurological:      General: No focal deficit present.      Mental Status: He is alert and oriented to person, place, and time.   Psychiatric:         Mood and Affect: Mood normal.         Thought Content: Thought content normal.         Labs:    Complete Blood Count  Lab Results   Component Value Date    RBC 6.84 (H) 06/01/2023    HGB 15.7 06/01/2023    HCT 51.5 06/01/2023    MCV 75 (L) 06/01/2023    MCH 23.0 (L) 06/01/2023    MCHC 30.5 (L) 06/01/2023    RDW 16.7 (H) 06/01/2023     06/01/2023    MPV SEE COMMENT 06/01/2023    GRAN 1.2 (L) 06/01/2023    GRAN 32.2 (L) 06/01/2023    LYMPH 1.9 06/01/2023    LYMPH 48.8 (H) 06/01/2023    MONO 0.3 06/01/2023    MONO 8.7 06/01/2023    EOS 0.4 06/01/2023    BASO 0.02 06/01/2023    EOSINOPHIL 9.5 (H) 06/01/2023    BASOPHIL  "0.5 06/01/2023    DIFFMETHOD Automated 06/01/2023       Comprehensive Metabolic Panel  No results found for: "GLU", "BUN", "CREATININE", "NA", "K", "CL", "PROT", "ALBUMIN", "BILITOT", "AST", "ALKPHOS", "CO2", "ALT", "ANIONGAP", "EGFRNONAA", "ESTGFRAFRICA"    TSH  No results found for: "TSH"    Imaging:  MRI Prostate W W/O Contrast  Narrative: EXAMINATION:  MRI PROSTATE W W/O CONTRAST    CLINICAL HISTORY:  Prostate cancer suspected;elevated PSA;  Elevated prostate specific antigen (PSA)    TECHNIQUE:  MRI prostate in accordance with PI-RADS recommendations before and after intravenous administration of 10 mL Gadavist.    COMPARISON:  None.    FINDINGS:  Previous biopsy: None.    PSA: 6.8 ng/mL on 07/25/2024.    Prior therapy: None.    Prostate: 5.8 x 4.5 x 4.6 cm corresponding to a computed volume of 59 cc.    Peripheral zone: Band like T2 hypointensity in the right peripheral zone.  There is a focal lesion in the left peripheral zone at the midgland posteromedially.    Lesion #P-1    Location: Left, posterior medial, mid gland    Size: 1.7 cm    T2W: Heterogeneous signal intensity or non-circumscribed, rounded, moderate hypointensity. Includes others that do not qualify as 2, 4, or 5. Score 3.    DWI: Focal (discrete and different from the background) hypointense on ADC and/or focal hyperintense on high b-value DWI; may be markedly hypointense on ADC or markedly hyperintense on high b-value DWI, but not both. Score 3.    DCE: Positive.    Prostate margin: Tumor-capsule interface >1.0 cm, but no breach of the capsule.    PI-RADS category: 4    Transitional zone: Lesion in the left anterior transition zone at the mid gland/base    Lesion #T-1    Location: Left, anterior, mid gland/base    Size: 1.2 cm    T2W: Heterogeneous signal intensity with obscured margins. Includes others that do not qualify as 2, 4, or 5. Score 3.    DWI: Focal (discrete and different from the background) hypointense on ADC and/or focal " hyperintense on high b-value DWI; may be markedly hypointense on ADC or markedly hyperintense on high b-value DWI, but not both. Score 3.    DCE: Positive.    Prostate margin: No involvement.    PI-RADS category: 3    Neurovascular bundle: Normal appearance.    Seminal vesicles: Normal appearance.    Other pelvic organs: Bladder and rectum are unremarkable.    Lymph nodes: No lymphadenopathy.    Soft tissues/bones: No marrow replacing lesions.  Impression: Two PIRADS 3 lesions on the left as described.    Overall Assessment: PI-RADS 3 - Intermediate (the presence of clinically significant cancer is equivocal).    Number of targets created for potential MR/US fusion biopsy:    Peripheral zone: 1    Transition zone: 1    Electronically signed by resident: Tika Figueroa  Date:    10/17/2024  Time:    16:05    Electronically signed by: Graeme Dahl  Date:    10/17/2024  Time:    17:16      Assessment/Plan:  Assessment & Plan    Toe injury likely a sprain that is improving.  Prostate cancer slow-growing and not currently requiring intervention.  Overall health status without significant concerns.    PROSTATE CANCER:  - Monitored patient's prostate cancer, which is slow growing and currently not causing any problems.  - No chemotherapy required at this time as the cancer is not active.  - Advised follow-up with urologist       TOE INJURY:  - Monitored patient's toe which was previously swollen but has decreased in size.  - Willis can bend the toe without pain.  - Toe appears normal; x-ray not indicated at this time.    VITAMIN D DEFICIENCY:  - Continued vitamin D supplementation.  - Willis has refilled medication.    1. Wellness examination    2. Great toe pain, right  -     TSH; Future; Expected date: 05/26/2025    3. Primary prostate adenocarcinoma  -     Prostate Specific Antigen, Diagnostic; Future; Expected date: 05/26/2025  -     CBC Auto Differential; Future  -     Comprehensive Metabolic Panel; Future; Expected  date: 05/26/2025    4. Hyperglycemia  -     Hemoglobin A1C; Future; Expected date: 05/26/2025    5. Screening for cardiovascular condition  -     Lipid Panel; Future; Expected date: 05/26/2025    6. Abnormal findings on diagnostic imaging of other specified body structures  -     TSH; Future; Expected date: 05/26/2025    7. Vitamin D deficiency  -     ergocalciferol (ERGOCALCIFEROL) 50,000 unit Cap; Take 1 capsule (50,000 Units total) by mouth every 7 days.  Dispense: 12 capsule; Refill: 4    8. Mixed hyperlipidemia  -     atorvastatin (LIPITOR) 40 MG tablet; TAKE 1 TABLET(40 MG) BY MOUTH EVERY DAY  Dispense: 90 tablet; Refill: 3         Discussed how to stay healthy including: diet, exercise, refraining from smoking and discussed screening exams / tests needed for age, sex and family Hx.    RTC  6 mo    Derrek Vigil MD    This note was generated with the assistance of ambient listening technology. Verbal consent was obtained by the patient and accompanying visitor(s) for the recording of patient appointment to facilitate this note. I attest to having reviewed and edited the generated note for accuracy, though some syntax or spelling errors may persist. Please contact the author of this note for any clarification.         [1]   Current Outpatient Medications on File Prior to Visit   Medication Sig Dispense Refill    acetaminophen (TYLENOL ARTHRITIS PAIN ORAL) Take 2 tablets by mouth as needed.      aspirin 325 MG tablet Take 1 tablet (325 mg total) by mouth once daily. 30 tablet 11    tadalafiL (CIALIS) 10 MG tablet Take 1 tablet (10 mg total) by mouth daily as needed for Erectile Dysfunction. 30 tablet 11    [DISCONTINUED] atorvastatin (LIPITOR) 40 MG tablet TAKE 1 TABLET(40 MG) BY MOUTH EVERY DAY 90 tablet 3    [DISCONTINUED] ergocalciferol (ERGOCALCIFEROL) 50,000 unit Cap Take 1 capsule (50,000 Units total) by mouth every 7 days. 12 capsule 1     No current facility-administered medications on file prior to visit.

## 2025-07-24 ENCOUNTER — TELEPHONE (OUTPATIENT)
Dept: PULMONOLOGY | Facility: CLINIC | Age: 75
End: 2025-07-24
Payer: MEDICARE

## 2025-07-24 DIAGNOSIS — Z87.891 HISTORY OF TOBACCO USE: Primary | ICD-10-CM

## 2025-08-26 ENCOUNTER — LAB VISIT (OUTPATIENT)
Dept: LAB | Facility: HOSPITAL | Age: 75
End: 2025-08-26
Attending: STUDENT IN AN ORGANIZED HEALTH CARE EDUCATION/TRAINING PROGRAM
Payer: MEDICARE

## 2025-08-26 DIAGNOSIS — R73.9 HYPERGLYCEMIA: ICD-10-CM

## 2025-08-26 DIAGNOSIS — Z13.6 SCREENING FOR CARDIOVASCULAR CONDITION: ICD-10-CM

## 2025-08-26 DIAGNOSIS — M79.674 GREAT TOE PAIN, RIGHT: ICD-10-CM

## 2025-08-26 DIAGNOSIS — C61 PRIMARY PROSTATE ADENOCARCINOMA: ICD-10-CM

## 2025-08-26 DIAGNOSIS — R93.89 ABNORMAL FINDINGS ON DIAGNOSTIC IMAGING OF OTHER SPECIFIED BODY STRUCTURES: ICD-10-CM

## 2025-08-26 LAB
ABSOLUTE EOSINOPHIL (OHS): 0.13 K/UL
ABSOLUTE MONOCYTE (OHS): 0.58 K/UL (ref 0.3–1)
ABSOLUTE NEUTROPHIL COUNT (OHS): 2.7 K/UL (ref 1.8–7.7)
BASOPHILS # BLD AUTO: 0.04 K/UL
BASOPHILS NFR BLD AUTO: 0.7 %
CHOLEST SERPL-MCNC: 113 MG/DL (ref 120–199)
CHOLEST/HDLC SERPL: 3.6 {RATIO} (ref 2–5)
EAG (OHS): 131 MG/DL (ref 68–131)
ERYTHROCYTE [DISTWIDTH] IN BLOOD BY AUTOMATED COUNT: 15.3 % (ref 11.5–14.5)
HBA1C MFR BLD: 6.2 % (ref 4–5.6)
HCT VFR BLD AUTO: 51 % (ref 40–54)
HDLC SERPL-MCNC: 31 MG/DL (ref 40–75)
HDLC SERPL: 27.4 % (ref 20–50)
HGB BLD-MCNC: 15.5 GM/DL (ref 14–18)
IMM GRANULOCYTES # BLD AUTO: 0.02 K/UL (ref 0–0.04)
IMM GRANULOCYTES NFR BLD AUTO: 0.3 % (ref 0–0.5)
LDLC SERPL CALC-MCNC: 55.2 MG/DL (ref 63–159)
LYMPHOCYTES # BLD AUTO: 2.25 K/UL (ref 1–4.8)
MCH RBC QN AUTO: 22.6 PG (ref 27–31)
MCHC RBC AUTO-ENTMCNC: 30.4 G/DL (ref 32–36)
MCV RBC AUTO: 74 FL (ref 82–98)
NONHDLC SERPL-MCNC: 82 MG/DL
NUCLEATED RBC (/100WBC) (OHS): 0 /100 WBC
PLATELET # BLD AUTO: 195 K/UL (ref 150–450)
PMV BLD AUTO: ABNORMAL FL
PSA SERPL-MCNC: 8.11 NG/ML
RBC # BLD AUTO: 6.86 M/UL (ref 4.6–6.2)
RELATIVE EOSINOPHIL (OHS): 2.3 %
RELATIVE LYMPHOCYTE (OHS): 39.3 % (ref 18–48)
RELATIVE MONOCYTE (OHS): 10.1 % (ref 4–15)
RELATIVE NEUTROPHIL (OHS): 47.3 % (ref 38–73)
TRIGL SERPL-MCNC: 134 MG/DL (ref 30–150)
TSH SERPL-ACNC: 1.29 UIU/ML (ref 0.4–4)
WBC # BLD AUTO: 5.72 K/UL (ref 3.9–12.7)

## 2025-08-26 PROCEDURE — 84153 ASSAY OF PSA TOTAL: CPT | Mod: PN

## 2025-08-26 PROCEDURE — 80061 LIPID PANEL: CPT | Mod: PN

## 2025-08-26 PROCEDURE — 36415 COLL VENOUS BLD VENIPUNCTURE: CPT | Mod: PN

## 2025-08-26 PROCEDURE — 83036 HEMOGLOBIN GLYCOSYLATED A1C: CPT | Mod: PN

## 2025-08-26 PROCEDURE — 84443 ASSAY THYROID STIM HORMONE: CPT | Mod: PN

## 2025-08-26 PROCEDURE — 85025 COMPLETE CBC W/AUTO DIFF WBC: CPT | Mod: PN
